# Patient Record
Sex: FEMALE | Race: WHITE | NOT HISPANIC OR LATINO | Employment: FULL TIME | URBAN - METROPOLITAN AREA
[De-identification: names, ages, dates, MRNs, and addresses within clinical notes are randomized per-mention and may not be internally consistent; named-entity substitution may affect disease eponyms.]

---

## 2017-01-17 ENCOUNTER — TELEPHONE (OUTPATIENT)
Dept: OPHTHALMOLOGY | Facility: CLINIC | Age: 22
End: 2017-01-17

## 2017-01-17 ENCOUNTER — OFFICE VISIT (OUTPATIENT)
Dept: OPHTHALMOLOGY | Facility: CLINIC | Age: 22
End: 2017-01-17
Attending: OPHTHALMOLOGY
Payer: COMMERCIAL

## 2017-01-17 DIAGNOSIS — H34.8192 CRVO (CENTRAL RETINAL VEIN OCCLUSION) (H): ICD-10-CM

## 2017-01-17 DIAGNOSIS — H53.10 SUBJECTIVE VISUAL DISTURBANCE: Primary | ICD-10-CM

## 2017-01-17 DIAGNOSIS — H34.8192 CRVO (CENTRAL RETINAL VEIN OCCLUSION) (H): Primary | ICD-10-CM

## 2017-01-17 PROCEDURE — 99212 OFFICE O/P EST SF 10 MIN: CPT | Mod: ZF

## 2017-01-17 ASSESSMENT — EXTERNAL EXAM - RIGHT EYE: OD_EXAM: NORMAL

## 2017-01-17 ASSESSMENT — SLIT LAMP EXAM - LIDS
COMMENTS: NORMAL
COMMENTS: NORMAL

## 2017-01-17 ASSESSMENT — REFRACTION_WEARINGRX
OS_CYLINDER: +0.50
OS_AXIS: 084
OD_AXIS: 146
OD_SPHERE: -4.75
OS_SPHERE: -4.75
SPECS_TYPE: SVL
OD_CYLINDER: +0.50

## 2017-01-17 ASSESSMENT — VISUAL ACUITY
METHOD: SNELLEN - LINEAR
OS_CC+: -1
CORRECTION_TYPE: GLASSES
OS_CC: 20/20
OD_CC: 20/20

## 2017-01-17 ASSESSMENT — TONOMETRY
OD_IOP_MMHG: 18
OS_IOP_MMHG: 17
IOP_METHOD: TONOPEN

## 2017-01-17 ASSESSMENT — CUP TO DISC RATIO
OD_RATIO: 0.05
OS_RATIO: 0.0

## 2017-01-17 ASSESSMENT — EXTERNAL EXAM - LEFT EYE: OS_EXAM: NORMAL

## 2017-01-17 ASSESSMENT — CONF VISUAL FIELD
OS_NORMAL: 1
OD_NORMAL: 1

## 2017-01-17 NOTE — PROGRESS NOTES
CC -  floaters both eyes  HPI - No change since FANTA.      Past History  Jennifer Stevens is a  21 year old year-old patient with history CRVO 11/2016. Lab workup normal, stopped OCPs. Had avastin injection for CME 11/3 with resolution. Presents with one week history of increasing floaters in left eye, and 1 day history of floaters. Denies flashing light, denies pain. She also notes increasing glare. She states that the floaters are black lines with a gray shadow and intermittent. At any given time, there are 2 floaters and they are visible >15 times daily      CBC- normal, ESR- normal, lupus anticoagulant - negative, PT- normal , PTT- normal, INR- normal, Activated Protein C Resistance - normal, Factor V Leiden- negative, anti-cardiolipin antibody-negative, vasculitis panel (myeloperoxidase antibody & proteinase 3 antibody) - negative, ANCA- negative, RPR-negative, quantiferon gold- negative, WILDA- negative, BMP-within normal limits,                                      PAST OCULAR HISTORY  None    FOHx/FMHx: mother with recurrent clots, unclear etiology (mentioned possible Takaysu Arteritis)    RETINAL IMAGING:  OCT  11-29-16  right eye- normal contour, no IRF/SRF, stable  left eye - no fluid, greatly imrproved    US  OD- likely ONH drusen  OS- ?mild ONH drusen    FA 11-1-16  RE: normal    LE: (transits) slowed venous filling, extensive blocking from IRH, late leakage from ONH and in macula, diffuse staining of veins, no obvious ischemia    OVF 30-2 12-27-16  OD - normal likely  OS - diffuse depression, better than prior    OCT 1/17/17  OD normal contour, no srf/irf  OS normal contour, no srf/irf    ASSESSMENT & PLAN  1. Subjective visual disturbance, both eyes     -ocular migraine vs PVD   -exam and oct grossly normal   -VA 20/20 OU   -schedule retina follow up one week   RTC precautions discussed       2.  Central Retinal Vein Occlusion OS              - young patient, unclear etiology              - onset  11/3/16              - likely non-ischemic given excellent vision today              - saw hematology stopped OCP              -Possible ON drusen Right eye > Left eye (not seen by Bscan)              - consider sleep study    3.  H/o CME OS              - resolved after Avastin #1 on 11/3/16              - observe today      4.  H/o Pain OS with eye movement              - eval by Dr. Presley for optic neuritis with MRI negative    5. ? Optic disc drusen   - to consider ON autofluorescence, GINNA OCT once disc edema resolves       Pt seen and discussed with Dr. Everette Daugherty MD  PGY2, Dept of Ophthalmology  [phone]    Attending Physician Attestation:  I have seen and examined this patient.  I have confirmed and edited as necessary the chief complaint(s), history of present illness, review of systems, relevant history, and examination findings as documented by others.  I have personally reviewed the relevant tests, images, and reports as documented above.  I have confirmed and edited as necessary the assessment and plan and agree with this note.  - Everette Powell MD 3:27 PM 1/18/2017

## 2017-01-17 NOTE — TELEPHONE ENCOUNTER
H/o central retina vein occlusion in past  Pt calling in reporting bilateral large floaters-- giant lines in vision  Visual acuity and peripheral vision stable  No flashing  Pt scheduled today for bubba Jackson RN 1:25 PM 01/17/2017

## 2017-01-17 NOTE — NURSING NOTE
Chief Complaints and History of Present Illnesses   Patient presents with     Follow Up For     large floater in both eyes     HPI    Affected eye(s):  Both   Symptoms:     Decreased vision (Comment: left eye seems a little worse)   Floaters (Comment: both eyes)   No flashes      Duration:  1 day      Do you have eye pain now?:  No      Comments:  Pt here for floaters, both eyes  Pt has had floaters in the left eye that had gotten worse over the last week or so  Floater in right eye started yesterday    Sabina Pérez COA 2:59 PM January 17, 2017

## 2017-01-25 ENCOUNTER — OFFICE VISIT (OUTPATIENT)
Dept: OPHTHALMOLOGY | Facility: CLINIC | Age: 22
End: 2017-01-25
Attending: OPHTHALMOLOGY
Payer: COMMERCIAL

## 2017-01-25 DIAGNOSIS — H53.10 SUBJECTIVE VISUAL DISTURBANCE: ICD-10-CM

## 2017-01-25 DIAGNOSIS — H34.8192 CRVO (CENTRAL RETINAL VEIN OCCLUSION) (H): Primary | ICD-10-CM

## 2017-01-25 PROCEDURE — 99213 OFFICE O/P EST LOW 20 MIN: CPT | Mod: ZF

## 2017-01-25 ASSESSMENT — TONOMETRY
IOP_METHOD: TONOPEN
OS_IOP_MMHG: 18
OD_IOP_MMHG: 14

## 2017-01-25 ASSESSMENT — EXTERNAL EXAM - LEFT EYE: OS_EXAM: NORMAL

## 2017-01-25 ASSESSMENT — CUP TO DISC RATIO
OS_RATIO: 0.0
OD_RATIO: 0.05

## 2017-01-25 ASSESSMENT — EXTERNAL EXAM - RIGHT EYE: OD_EXAM: NORMAL

## 2017-01-25 ASSESSMENT — VISUAL ACUITY
OD_CC: 20/20
OS_CC+: -1
OS_CC: 20/20
CORRECTION_TYPE: GLASSES
METHOD: SNELLEN - LINEAR

## 2017-01-25 ASSESSMENT — CONF VISUAL FIELD: OD_NORMAL: 1

## 2017-01-25 ASSESSMENT — SLIT LAMP EXAM - LIDS
COMMENTS: NORMAL
COMMENTS: NORMAL

## 2017-01-25 NOTE — MR AVS SNAPSHOT
After Visit Summary   2017    Jennifer Stevens    MRN: [id number]           Patient Information     Date Of Birth          1995        Visit Information        Provider Department      2017 7:45 AM Cyndi Sims MD [facility] Clinic        Today's Diagnoses     CRVO (central retinal vein occlusion)    -  1     Subjective visual disturbance - Right Eye            Follow-ups after your visit        Follow-up notes from your care team     Return in about 1 month (around 2017) for Re-check CRVO OS with DFE/OCT.      Your next 10 appointments already scheduled     2017  8:15 AM   RETURN RETINA with Cyndi Sims MD   Eye Clinic ([facility])    [address]  [address] [facility]  9th Fl Clin [address] [place] [id number] [phone]              Who to contact     Please call your clinic at [phone] to:    Ask questions about your health    Make or cancel appointments    Discuss your medicines    Learn about your test results    Speak to your doctor   If you have compliments or concerns about an experience at your clinic, or if you wish to file a complaint, please contact [facility] Patient Relations at [phone] or email us at [email or web address]         Additional Information About Your Visit        MyChart Information     Busportal is an electronic gateway that provides easy, online access to your medical records. With Busportal, you can request a clinic appointment, read your test results, renew a prescription or communicate with your care team.     To sign up for TheBlogTVt visit the website at [email or web address]   You will be asked to enter the access code listed below, as well as some personal information. Please follow the directions to create your username and password.     Your access code is: [email or web address]  Expires: 2017  9:48 PM     Your access code will  in 90 days.  If you need help or a new code, please contact your [facility] or call [phone] for assistance.        Care EveryWhere ID     This is your Care EveryWhere ID. This could be used by other organizations to access your [place] medical records  [id number]         Blood Pressure from Last 3 Encounters:   12/09/16 121/82   10/31/16 124/73    Weight from Last 3 Encounters:   12/09/16 90.402 kg (199 lb 4.8 oz)   10/31/16 88.4 kg (194 lb 14.2 oz)              Today, you had the following     No orders found for display       Primary Care Provider Office Phone # Fax #    Aminah RODRIGUEZ Jayla [phone]       [facility] [address] [facility] [address]        Thank you!     Thank you for choosing EYE CLINIC  for your care. Our goal is always to provide you with excellent care. Hearing back from our patients is one way we can continue to improve our services. Please take a few minutes to complete the written survey that you may receive in the mail after your visit with us. Thank you!             Your Updated Medication List - Protect others around you: Learn how to safely use, store and throw away your medicines at www.disposemymeds.org.      Notice  As of 1/25/2017  9:18 AM    You have not been prescribed any medications.

## 2017-01-25 NOTE — NURSING NOTE
Chief Complaints and History of Present Illnesses   Patient presents with     Follow Up For     Central Retinal Vein Occlusion OS     HPI    Affected eye(s):  Left   Symptoms:     No blurred vision   No decreased vision         Do you have eye pain now?:  No      Comments:  Pt states vision and eyes are stable since last visit.    Nataliya HERNANDEZ 8:21 AM January 25, 2017

## 2017-01-25 NOTE — PROGRESS NOTES
CC -  floaters both eyes  HPI - No change since FANTA.      Past History  Jennifer Stevens is a  21 year old year-old patient with history CRVO 11/2016. Lab workup normal, stopped OCPs. Had avastin injection for CME 11/3 with resolution. Presents with one week history of increasing floaters in left eye, and 1 day history of floaters. Denies flashing light, denies pain. She also notes increasing glare. She states that the floaters are black lines with a gray shadow and intermittent. At any given time, there are 2 floaters and they are visible >15 times daily    CBC- normal, ESR- normal, lupus anticoagulant - negative, PT- normal , PTT- normal, INR- normal, Activated Protein C Resistance - normal, Factor V Leiden- negative, anti-cardiolipin antibody-negative, vasculitis panel (myeloperoxidase antibody & proteinase 3 antibody) - negative, ANCA- negative, RPR-negative, quantiferon gold- negative, WILDA- negative, BMP-within normal limits,                     PAST OCULAR HISTORY  None    FOHx/FMHx: mother with recurrent clots, unclear etiology (mentioned possible Takaysu Arteritis)    RETINAL IMAGING:  OCT  11-29-16  right eye- normal contour, no IRF/SRF, stable  left eye - no fluid, greatly imrproved    US  OD- likely ONH drusen  OS- ?mild ONH drusen    FA 11-1-16  RE: normal    LE: (transits) slowed venous filling, extensive blocking from IRH, late leakage from ONH and in macula, diffuse staining of veins, no obvious ischemia    OVF 30-2 12-27-16  OD - normal likely  OS - diffuse depression, better than prior    OCT 1/17/17  OD normal contour, no srf/irf  OS normal contour, no srf/irf    ASSESSMENT & PLAN  1. Subjective visual disturbance, both eyes   - ocular migraine favored (no evidence of pvd/breaks on exam)   - VA 20/20 OU   - RT/RD precautions       2.  Central Retinal Vein Occlusion OS              - young patient, unclear etiology              - onset 11/3/16              - likely non-ischemic given excellent  vision today              - saw hematology stopped OCP              - Possible ON drusen Right eye > Left eye (not seen by Bscan)              - consider sleep study   - rare vitreous cell noted today, has had extensive negative w/u - will monitor    3.  H/o CME OS              - resolved after Avastin #1 on 11/3/16              - observe today    4.  H/o Pain OS with eye movement              - eval by Dr. Presley for optic neuritis with MRI negative    5. ? Optic disc drusen   - to consider ON autofluorescence, GINNA OCT once disc edema resolves    RTC: 1 month with Dr. Nelsy Barrios MD  Retina Fellow      Attestation:  I have seen and examined the patient with Dr. Barrios and agree with the findings in this note.     Cyndi Finley MD PhD.  Professor & Chair

## 2017-02-23 ENCOUNTER — OFFICE VISIT (OUTPATIENT)
Dept: OPHTHALMOLOGY | Facility: CLINIC | Age: 22
End: 2017-02-23
Attending: OPHTHALMOLOGY
Payer: COMMERCIAL

## 2017-02-23 DIAGNOSIS — H47.323 DRUSEN OF OPTIC DISC, BILATERAL: ICD-10-CM

## 2017-02-23 DIAGNOSIS — H34.8192 CRVO (CENTRAL RETINAL VEIN OCCLUSION) (H): Primary | ICD-10-CM

## 2017-02-23 DIAGNOSIS — H53.10 SUBJECTIVE VISUAL DISTURBANCE: ICD-10-CM

## 2017-02-23 PROCEDURE — 92250 FUNDUS PHOTOGRAPHY W/I&R: CPT | Mod: ZF | Performed by: OPHTHALMOLOGY

## 2017-02-23 PROCEDURE — 92134 CPTRZ OPH DX IMG PST SGM RTA: CPT | Mod: ZF | Performed by: OPHTHALMOLOGY

## 2017-02-23 PROCEDURE — 99212 OFFICE O/P EST SF 10 MIN: CPT | Mod: 25,ZF

## 2017-02-23 ASSESSMENT — EXTERNAL EXAM - LEFT EYE: OS_EXAM: NORMAL

## 2017-02-23 ASSESSMENT — TONOMETRY
OD_IOP_MMHG: 18
IOP_METHOD: ICARE
OS_IOP_MMHG: 18

## 2017-02-23 ASSESSMENT — REFRACTION_WEARINGRX
OS_AXIS: 084
OS_SPHERE: -4.75
SPECS_TYPE: SVL
OD_SPHERE: -4.75
OS_CYLINDER: +0.50
OD_AXIS: 146
OD_CYLINDER: +0.50

## 2017-02-23 ASSESSMENT — EXTERNAL EXAM - RIGHT EYE: OD_EXAM: NORMAL

## 2017-02-23 ASSESSMENT — VISUAL ACUITY
OS_CC: 20/20
METHOD: SNELLEN - LINEAR
OD_CC: 20/20

## 2017-02-23 ASSESSMENT — CUP TO DISC RATIO
OD_RATIO: 0.05
OS_RATIO: 0.0

## 2017-02-23 ASSESSMENT — SLIT LAMP EXAM - LIDS
COMMENTS: NORMAL
COMMENTS: NORMAL

## 2017-02-23 ASSESSMENT — CONF VISUAL FIELD
OS_NORMAL: 1
OD_NORMAL: 1

## 2017-02-23 NOTE — MR AVS SNAPSHOT
After Visit Summary   2/23/2017    Jennifer Stevens    MRN: [id number]           Patient Information     Date Of Birth          1995        Visit Information        Provider Department      2/23/2017 8:15 AM Cyndi Sims MD Eye Clinic        Today's Diagnoses     CRVO (central retinal vein occlusion)    -  1    Subjective visual disturbance - Right Eye        Drusen of optic disc, bilateral           Follow-ups after your visit        Follow-up notes from your care team     Return in about 6 months (around 8/23/2017) for  , CRVO, onh drusen, Exam and OVF OU.      Your next 10 appointments already scheduled     Aug 24, 2017  7:30 AM CDT   RETURN RETINA with Cyndi Sims MD   Eye Clinic ([facility])    [address]  [address] [facility]  9th Fl Clin 9a  [facility] [id number] [phone]              Future tests that were ordered for you today     Open Future Orders        Priority Expected Expires Ordered    Glaucoma Top OU Routine  8/27/2018 2/23/2017            Who to contact     Please call your clinic at [phone] to:    Ask questions about your health    Make or cancel appointments    Discuss your medicines    Learn about your test results    Speak to your doctor   If you have compliments or concerns about an experience at your clinic, or if you wish to file a complaint, please contact [facility] Patient Relations at [phone] or email us at [email or web address]         Additional Information About Your Visit        MyChart Information     Verengo Solart is an electronic gateway that provides easy, online access to your medical records. With Predictify, you can request a clinic appointment, read your test results, renew a prescription or communicate with your care team.     To sign up for Verengo Solart visit the website at [email or web address]   You will be asked to enter the access code listed below,  as well as some personal information. Please follow the directions to create your username and password.     Your access code is: [email or web address]  Expires: 5/10/2017  8:30 AM     Your access code will  in 90 days. If you need help or a new code, please contact your [facility] or call [phone] for assistance.        Care EveryWhere ID     This is your Care EveryWhere ID. This could be used by other organizations to access your [place] medical records  [id number]         Blood Pressure from Last 3 Encounters:   16 121/82   10/31/16 124/73    Weight from Last 3 Encounters:   16 90.4 kg (199 lb 4.8 oz)   10/31/16 88.4 kg (194 lb 14.2 oz)              We Performed the Following     Fundus Autofluorescence Image (FAF) OU (both eyes)     OCT Retina Spectralis OU (both eyes)        Primary Care Provider Office Phone # Fax #    Aminah JENNIFER Dickerson [phone]       [facility] [address] [facility] [address]        Thank you!     Thank you for choosing EYE CLINIC  for your care. Our goal is always to provide you with excellent care. Hearing back from our patients is one way we can continue to improve our services. Please take a few minutes to complete the written survey that you may receive in the mail after your visit with us. Thank you!             Your Updated Medication List - Protect others around you: Learn how to safely use, store and throw away your medicines at www.disposemymeds.org.      Notice  As of 2017  9:50 AM    You have not been prescribed any medications.

## 2017-02-23 NOTE — PROGRESS NOTES
CC -  floaters both eyes  HPI - No change since FANTA.      Past History  Jennifer Stevens is a  21 year old year-old patient with history CRVO 11/2016. Lab workup normal, stopped OCPs. Had avastin injection for CME 11/3 with resolution. Presents with one week history of increasing floaters in left eye, and 1 day history of floaters. Denies flashing light, denies pain. She also notes increasing glare. She states that the floaters are black lines with a gray shadow and intermittent. At any given time, there are 2 floaters and they are visible >15 times daily    CBC- normal, ESR- normal, lupus anticoagulant - negative, PT- normal , PTT- normal, INR- normal, Activated Protein C Resistance - normal, Factor V Leiden- negative, anti-cardiolipin antibody-negative, vasculitis panel (myeloperoxidase antibody & proteinase 3 antibody) - negative, ANCA- negative, RPR-negative, quantiferon gold- negative, WILDA- negative, BMP-within normal limits,                     PAST OCULAR HISTORY  None    FOHx/FMHx: mother with recurrent clots, unclear etiology (mentioned possible Takaysu Arteritis)    RETINAL IMAGING:  OCT  11-29-16  right eye- normal contour, no IRF/SRF, stable  left eye - no fluid, greatly imrproved    US  OD- likely ONH drusen  OS- ?mild ONH drusen    FA 11-1-16  RE: normal    LE: (transits) slowed venous filling, extensive blocking from IRH, late leakage from ONH and in macula, diffuse staining of veins, no obvious ischemia    OVF 30-2 12-27-16  OD - normal likely  OS - diffuse depression, better than prior    OCT 1/17/17 and today OD normal contour, no srf/irf  OS normal contour, no srf/irf    ASSESSMENT & PLAN  1. Subjective visual disturbance, both eyes   - ocular migraine favored (no evidence of pvd/breaks on exam)   - VA 20/20 OU   - RT/RD precautions       2.  Central Retinal Vein Occlusion OS              - young patient, unclear etiology              - onset 11/3/16              - likely non-ischemic given  excellent vision today              - saw hematology stopped OCP              - Possible ON drusen Right eye > Left eye (not seen by Bscan)              - consider sleep study   - rare vitreous cell noted today, has had extensive negative w/u - will monitor    3.  H/o CME OS              - resolved after Avastin #1 on 11/3/16              - observe today    4.  H/o Pain OS with eye movement              - eval by Dr. Presley for optic neuritis with MRI negative    5. Optic disc drusen both eyes    - to consider ON autofluorescence, GINNA OCT once disc edema resolves    RTC: 6 months with Dr. Whittington and repeat OVF both eyes        Cyndi Finley MD PhD.  Professor & Chair

## 2017-02-23 NOTE — NURSING NOTE
Chief Complaints and History of Present Illnesses   Patient presents with     Follow Up For      Central Retinal Vein Occlusion OS     HPI    Affected eye(s):  Both   Symptoms:        Duration:  1 month   Frequency:  Constant       Do you have eye pain now?:  No      Comments:  Pt. States that she is doing great after having birth control nexplanon removed.   No longer having any symptoms BE.  Geneva Wilson COT 8:13 AM February 23, 2017

## 2017-05-11 ENCOUNTER — OFFICE VISIT (OUTPATIENT)
Dept: OPHTHALMOLOGY | Facility: CLINIC | Age: 22
End: 2017-05-11
Attending: OPHTHALMOLOGY
Payer: COMMERCIAL

## 2017-05-11 DIAGNOSIS — H04.123 DRY EYE SYNDROME, BILATERAL: ICD-10-CM

## 2017-05-11 DIAGNOSIS — H34.8192 CRVO (CENTRAL RETINAL VEIN OCCLUSION) (H): Primary | ICD-10-CM

## 2017-05-11 DIAGNOSIS — H34.8192 CENTRAL VEIN OCCLUSION OF RETINA (H): Primary | ICD-10-CM

## 2017-05-11 PROCEDURE — 99212 OFFICE O/P EST SF 10 MIN: CPT | Mod: ZF

## 2017-05-11 PROCEDURE — 92134 CPTRZ OPH DX IMG PST SGM RTA: CPT | Mod: ZF | Performed by: OPHTHALMOLOGY

## 2017-05-11 RX ORDER — COPPER 313.4 MG/1
1 INTRAUTERINE DEVICE INTRAUTERINE ONCE
COMMUNITY
End: 2021-01-11

## 2017-05-11 ASSESSMENT — REFRACTION_WEARINGRX
OD_AXIS: 146
OD_CYLINDER: +0.50
OS_AXIS: 084
OS_CYLINDER: +0.50
OS_SPHERE: -4.75
SPECS_TYPE: SVL
OD_SPHERE: -4.75

## 2017-05-11 ASSESSMENT — VISUAL ACUITY
OS_CC+: -1
OS_CC: 20/20
CORRECTION_TYPE: GLASSES
METHOD: SNELLEN - LINEAR
OD_CC: 20/20

## 2017-05-11 ASSESSMENT — SLIT LAMP EXAM - LIDS
COMMENTS: NORMAL
COMMENTS: NORMAL

## 2017-05-11 ASSESSMENT — CUP TO DISC RATIO
OD_RATIO: 0.05
OS_RATIO: 0.0

## 2017-05-11 ASSESSMENT — CONF VISUAL FIELD
OS_NORMAL: 1
OD_NORMAL: 1

## 2017-05-11 ASSESSMENT — EXTERNAL EXAM - LEFT EYE: OS_EXAM: NORMAL

## 2017-05-11 ASSESSMENT — TONOMETRY
OD_IOP_MMHG: 19
IOP_METHOD: TONOPEN
OS_IOP_MMHG: 18

## 2017-05-11 ASSESSMENT — EXTERNAL EXAM - RIGHT EYE: OD_EXAM: NORMAL

## 2017-05-11 NOTE — MR AVS SNAPSHOT
After Visit Summary   2017    Jennifer Stevens    MRN: [id number]           Patient Information     Date Of Birth          1995        Visit Information        Provider Department      2017 7:45 AM Enmanuel Barrios MD Eye Clinic        Today's Diagnoses     CRVO (central retinal vein occlusion)    -  1    Dry eye syndrome, bilateral           Follow-ups after your visit        Follow-up notes from your care team     Return in about 3 months (around 2017) for CRVO for OVF 24-2, DFE, OCT.      Your next 10 appointments already scheduled     Aug 24, 2017  7:30 AM CDT   RETURN RETINA with Cyndi Sims MD   Eye Clinic ([facility])    [address]  [address] [facility]  9th Fl Clin 9a  [facility] [id number] [phone]              Who to contact     Please call your clinic at [phone] to:    Ask questions about your health    Make or cancel appointments    Discuss your medicines    Learn about your test results    Speak to your doctor   If you have compliments or concerns about an experience at your clinic, or if you wish to file a complaint, please contact [facility] Patient Relations at [phone] or email us at [email or web address]         Additional Information About Your Visit        MyChart Information     Skinny Mom is an electronic gateway that provides easy, online access to your medical records. With Skinny Mom, you can request a clinic appointment, read your test results, renew a prescription or communicate with your care team.     To sign up for Zattikkat visit the website at [email or web address]   You will be asked to enter the access code listed below, as well as some personal information. Please follow the directions to create your username and password.     Your access code is: [email or web address]  Expires: 2017  6:30 AM     Your access code will  in 90 days. If you need help or a  new code, please contact your [facility] or call [phone] for assistance.        Care EveryWhere ID     This is your Care EveryWhere ID. This could be used by other organizations to access your [place] medical records  [id number]         Blood Pressure from Last 3 Encounters:   12/09/16 121/82   10/31/16 124/73    Weight from Last 3 Encounters:   12/09/16 90.4 kg (199 lb 4.8 oz)   10/31/16 88.4 kg (194 lb 14.2 oz)              Today, you had the following     No orders found for display       Primary Care Provider Office Phone # Fax #    Aminah RODRIGUEZ Jayla [phone]       [facility] [address] [facility] [address]        Thank you!     Thank you for choosing EYE CLINIC  for your care. Our goal is always to provide you with excellent care. Hearing back from our patients is one way we can continue to improve our services. Please take a few minutes to complete the written survey that you may receive in the mail after your visit with us. Thank you!             Your Updated Medication List - Protect others around you: Learn how to safely use, store and throw away your medicines at www.disposemymeds.org.          This list is accurate as of: 5/11/17  8:33 AM.  Always use your most recent med list.                   Brand Name Dispense Instructions for use    paragard intrauterine copper      1 each by Intrauterine route once

## 2017-05-11 NOTE — PROGRESS NOTES
CC -  floaters both eyes    Interval History - vision improving, bubble getting smaller, no pain    HPI - No change since FANTA.      Past History  Jennifer Stevens is a  21 year old year-old patient with history CRVO 11/2016. Lab workup normal, stopped OCPs. Had avastin injection for CME 11/3 with resolution. Presents with one week history of increasing floaters in left eye, and 1 day history of floaters. Denies flashing light, denies pain. She also notes increasing glare. She states that the floaters are black lines with a gray shadow and intermittent. At any given time, there are 2 floaters and they are visible >15 times daily    CBC- normal, ESR- normal, lupus anticoagulant - negative, PT- normal , PTT- normal, INR- normal, Activated Protein C Resistance - normal, Factor V Leiden- negative, anti-cardiolipin antibody-negative, vasculitis panel (myeloperoxidase antibody & proteinase 3 antibody) - negative, ANCA- negative, RPR-negative, quantiferon gold- negative, WILDA- negative, BMP-within normal limits,                     PAST OCULAR HISTORY  None    FOHx/FMHx: mother with recurrent clots, unclear etiology (mentioned possible Takaysu Arteritis)    RETINAL IMAGING:  OCT  5/11/17  right eye- normal contour, stable  left eye - normal contour, no fluid, stable    US (prior)  OD- likely ONH drusen  OS- ?mild ONH drusen    FA 11-1-16  RE: normal    LE: (transits) slowed venous filling, extensive blocking from IRH, late leakage from ONH and in macula, diffuse staining of veins, no obvious ischemia    OVF 30-2 12-27-16  OD - normal likely  OS - diffuse depression, better than prior    ASSESSMENT & PLAN  1. Dry Eye Syndrome, both eyes   - left > right, likely source of monocular diplopia and vision changes   - encouraged artificial tears 3-4x daily       2.  Central Retinal Vein Occlusion OS              - young patient, unclear etiology              - onset 11/3/16              - likely non-ischemic given excellent  vision              - saw hematology stopped OCP              - Possible ON drusen Right eye > Left eye (not seen by Bscan)   - no fluid today              3.  H/o CME OS              - resolved after Avastin #1 on 11/3/16              - observe today    4.  H/o Pain OS with eye movement              - eval by Dr. Presley for optic neuritis with MRI negative    5. Optic disc drusen both eyes    - to consider ON autofluorescence, GINNA OCT once disc edema resolves    RTC: 3 months with OVF, OCT; sooner for changes    ATTESTATION    I have confirmed the CC, PMH, HPI, history, ROS, and exam findings by the technician or others, and modified by me where needed. I have confirmed and edited as necessary the relevant ophthalmic history, ROS, eye exam findings, and the neuro exam findings as obtained by others. I have seen and examined this patient. I was present for critical portions of the procedure carried out by the resident/fellow and immediately available for the entire procedure and I personally viewed the image(s) and I agree with the interpretation as documented by the resident/fellow/or others and edited by me.  Enmanuel Barrios MD  Retina Fellow

## 2017-05-11 NOTE — NURSING NOTE
"Chief Complaints and History of Present Illnesses   Patient presents with     Follow Up For     2.5 month f/u CRVO, left eye - new waves     HPI    Affected eye(s):  Left   Symptoms:     No decreased vision   Double vision (Comment: left eye only occasionally)   Floaters (Comment: floaters all the time; stable)   No flashes      Duration:  3 months      Do you have eye pain now?:  No      Comments:  2.5 month f/u CRVO, left eye  Pt states she is noting episodes of \"wavy/staticky\" vision in the left eye  Also having episodes of double vision in the left eye only - doesn't coincide with the \"staticky\" vision    Sabina HERNANDEZ 7:37 AM May 11, 2017              "

## 2017-08-24 ENCOUNTER — OFFICE VISIT (OUTPATIENT)
Dept: OPHTHALMOLOGY | Facility: CLINIC | Age: 22
End: 2017-08-24
Attending: OPHTHALMOLOGY
Payer: COMMERCIAL

## 2017-08-24 DIAGNOSIS — H34.8192 CRVO (CENTRAL RETINAL VEIN OCCLUSION) (H): Primary | ICD-10-CM

## 2017-08-24 PROCEDURE — 92134 CPTRZ OPH DX IMG PST SGM RTA: CPT | Mod: ZF | Performed by: OPHTHALMOLOGY

## 2017-08-24 PROCEDURE — 99213 OFFICE O/P EST LOW 20 MIN: CPT | Mod: ZF

## 2017-08-24 ASSESSMENT — TONOMETRY
OD_IOP_MMHG: 18
IOP_METHOD: TONOPEN
OS_IOP_MMHG: 18

## 2017-08-24 ASSESSMENT — REFRACTION_WEARINGRX
OD_SPHERE: -4.75
SPECS_TYPE: SVL
OD_AXIS: 146
OS_CYLINDER: +0.50
OS_SPHERE: -4.75
OS_AXIS: 084
OD_CYLINDER: +0.50

## 2017-08-24 ASSESSMENT — CONF VISUAL FIELD
OD_NORMAL: 1
OS_NORMAL: 1
METHOD: COUNTING FINGERS

## 2017-08-24 ASSESSMENT — SLIT LAMP EXAM - LIDS
COMMENTS: NORMAL
COMMENTS: NORMAL

## 2017-08-24 ASSESSMENT — CUP TO DISC RATIO
OD_RATIO: 0.05
OS_RATIO: 0.0

## 2017-08-24 ASSESSMENT — VISUAL ACUITY
CORRECTION_TYPE: GLASSES
OD_CC: 20/20
METHOD: SNELLEN - LINEAR
OS_CC: 20/20

## 2017-08-24 ASSESSMENT — EXTERNAL EXAM - LEFT EYE: OS_EXAM: NORMAL

## 2017-08-24 ASSESSMENT — EXTERNAL EXAM - RIGHT EYE: OD_EXAM: NORMAL

## 2017-08-24 NOTE — MR AVS SNAPSHOT
After Visit Summary   8/24/2017    Jennifer Stevens    MRN: [id number]           Patient Information     Date Of Birth          1995        Visit Information        Provider Department      8/24/2017 7:30 AM Cyndi Sims MD [facility]        Today's Diagnoses     CRVO (central retinal vein occlusion)    -  1       Follow-ups after your visit        Follow-up notes from your care team     Return in about 6 months (around 2/24/2018) for CRVO, OCT Macula.      Your next 10 appointments already scheduled     Feb 21, 2018 10:15 AM CST   RETURN RETINA with Cyndi Sims MD   Eye Clinic ([facility])    [address]  [address] [facility]  9th [address] Clin [address]  [facility] [id number] [phone]              Future tests that were ordered for you today     Open Future Orders        Priority Expected Expires Ordered    OCT Retina Spectralis OU (both eyes) Routine  2/25/2019 8/24/2017            Who to contact     Please call your clinic at [phone] to:    Ask questions about your health    Make or cancel appointments    Discuss your medicines    Learn about your test results    Speak to your doctor   If you have compliments or concerns about an experience at your clinic, or if you wish to file a complaint, please contact [facility] Patient Relations at [phone] or email us at [email or web address]         Additional Information About Your Visit        MyChart Information     [employer] is an electronic gateway that provides easy, online access to your medical records. With Grovo, you can request a clinic appointment, read your test results, renew a prescription or communicate with your care team.     To sign up for [employer] visit the website at [email or web address]   You will be asked to enter the access code listed below, as well as some personal information. Please follow the directions to create your  username and password.     Your access code is: [email or web address]  Expires: 2017  6:31 AM     Your access code will  in 90 days. If you need help or a new code, please contact your [facility] or call [phone] for assistance.        Care EveryWhere ID     This is your Care EveryWhere ID. This could be used by other organizations to access your [place] medical records  [id number]         Blood Pressure from Last 3 Encounters:   16 121/82   10/31/16 124/73    Weight from Last 3 Encounters:   16 90.4 kg (199 lb 4.8 oz)   10/31/16 88.4 kg (194 lb 14.2 oz)              We Performed the Following     OCT Retina Spectralis OU (both eyes)        Primary Care Provider Office Phone # Fax #    Aminah Dickerson [phone]       [facility] [address] [facility] [address]        Equal Access to Services     ALEXANDREA VILLAGOMEZ : Hadii aad ku hadasho Soomaali, waaxda luqadaha, qaybta kaalmada adeegyada, waxay idiin hayaan adeeg kharash la'aan . So [facility] [phone].    ATENCIÓN: Si habla español, tiene a jones disposición servicios gratuitos de asistencia lingüística. [facility] [phone].    We comply with applicable federal civil rights laws and [place] laws. We do not discriminate on the basis of race, color, national origin, age, disability sex, sexual orientation or gender identity.            Thank you!     Thank you for choosing EYE CLINIC  for your care. Our goal is always to provide you with excellent care. Hearing back from our patients is one way we can continue to improve our services. Please take a few minutes to complete the written survey that you may receive in the mail after your visit with us. Thank you!             Your Updated Medication List - Protect others around you: Learn how to safely use, store and throw away your medicines at www.disposemymeds.org.          This list is accurate as of: 17  9:07 AM.  Always use  your most recent med list.                   Brand Name Dispense Instructions for use Diagnosis    paragard intrauterine copper      1 each by Intrauterine route once

## 2017-08-24 NOTE — PROGRESS NOTES
"I have confirmed the patient's and reviewed Past Medical History, Past Surgical History, Social History, Family History, Problem List, Medication List and agree with Tech note.      CC -  floaters both eyes  HPI - notices transient decreased vision right eye when covering left,\"like a curtain covering it\" in the far periphery then returns to baseline. Floaters stable. Denies flashes      Past History  Jennifer Stevens is a  21 year old year-old patient with history CRVO OS 11/2016. Lab workup normal, stopped OCPs. Had avastin injection for CME 11/3 with resolution. She states that the floaters are black lines with a gray shadow and intermittent. At any given time, there are 2 floaters and they are visible >15 times daily      CBC- normal, ESR- normal, lupus anticoagulant - negative, PT- normal , PTT- normal, INR- normal, Activated Protein C Resistance - normal, Factor V Leiden- negative, anti-cardiolipin antibody-negative, vasculitis panel (myeloperoxidase antibody & proteinase 3 antibody) - negative, ANCA- negative, RPR-negative, quantiferon gold- negative, WILDA- negative, BMP-within normal limits,                          PAST OCULAR HISTORY  None    FOHx/FMHx: mother with recurrent clots, unclear etiology (mentioned possible Takaysu Arteritis)    RETINAL IMAGING:  OCT  8/24/17  right eye- normal contour, no IRF/SRF, stable  left eye - normal contour, no IRF/SRF    HVF 8/24/17  RE reliable study, nonspecific superior temporal defect  LE reliable study, wnl    US  OD- likely ONH drusen  OS- ?mild ONH drusen    FA 11-1-16  RE: normal    LE: (transits) slowed venous filling, extensive blocking from IRH, late leakage from ONH and in macula, diffuse staining of veins, no obvious ischemia    OVF 30-2 12-27-16  OD - normal likely  OS - diffuse depression, better than prior        ASSESSMENT & PLAN  1. Subjective visual disturbance, both eyes   -ocular migraine vs PVD   -exam and oct grossly normal   -VA 20/20 " OU   -recheck VF 1 year        2.  Central Retinal Vein Occlusion OS              - young patient, unclear etiology              - onset 11/1/16   - s/p Avastin x1 11/2016              - likely non-ischemic given excellent vision today              - saw hematology stopped OCP              - no NVI    3.  H/o CME OS              - resolved after Avastin #1 on 11/3/16              - observe today      4.  H/o Pain OS with eye movement              - eval by Dr. Presley for optic neuritis with MRI negative    5. Optic disc drusen   - to consider ON autofluorescence, GINNA OCT    - seen on B scan    6. Dry Eye Syndrome, both eyes                       - encouraged artificial tears 3-4x daily         RTC 6 months    Kulwinder Daugherty MD  PGY2, Dept of Ophthalmology  [phone]    ATTESTATION:  I have seen and examined the patient with Dr. Daugherty and agree with the findings in this note, as well as the interpretations of the diagnostic tests.    Cyndi Finley MD PhD.  Professor & Chair

## 2017-08-24 NOTE — NURSING NOTE
Chief Complaints and History of Present Illnesses   Patient presents with     Follow Up For     CRVO      HPI    Affected eye(s):  Both   Symptoms:        Frequency:  Constant       Do you have eye pain now?:  No      Comments:  Noticing out of the RE that objects and letter will disappear then come right back  +floaters but not new  Merline Theodore COT 7:57 AM August 24, 2017

## 2018-02-21 ENCOUNTER — OFFICE VISIT (OUTPATIENT)
Dept: OPHTHALMOLOGY | Facility: CLINIC | Age: 23
End: 2018-02-21
Attending: OPHTHALMOLOGY
Payer: COMMERCIAL

## 2018-02-21 DIAGNOSIS — H34.8192 CRVO (CENTRAL RETINAL VEIN OCCLUSION) (H): ICD-10-CM

## 2018-02-21 PROCEDURE — G0463 HOSPITAL OUTPT CLINIC VISIT: HCPCS | Mod: ZF

## 2018-02-21 PROCEDURE — 92134 CPTRZ OPH DX IMG PST SGM RTA: CPT | Mod: ZF | Performed by: OPHTHALMOLOGY

## 2018-02-21 ASSESSMENT — TONOMETRY
OD_IOP_MMHG: 16
OS_IOP_MMHG: 17
IOP_METHOD: ICARE

## 2018-02-21 ASSESSMENT — CONF VISUAL FIELD
OS_NORMAL: 1
OD_NORMAL: 1

## 2018-02-21 ASSESSMENT — REFRACTION_WEARINGRX
OS_SPHERE: -4.75
SPECS_TYPE: SVL
OD_CYLINDER: +0.50
OS_AXIS: 084
OS_CYLINDER: +0.50
OD_AXIS: 146
OD_SPHERE: -4.75

## 2018-02-21 ASSESSMENT — VISUAL ACUITY
OS_CC+: -2
OD_CC+: -2
OD_CC: 20/20
OS_CC: 20/20
CORRECTION_TYPE: GLASSES
METHOD: SNELLEN - LINEAR

## 2018-02-21 NOTE — MR AVS SNAPSHOT
After Visit Summary   2018    Jennifer Stevens    MRN: [id number]           Patient Information     Date Of Birth          1995        Visit Information        Provider Department      2018 10:15 AM yCndi Sims MD [facility] Clinic        Today's Diagnoses     CRVO (central retinal vein occlusion)           Follow-ups after your visit        Follow-up notes from your care team     Return in about 6 months (around 2018) for CRVO, Exam & OCT OU.      Your next 10 appointments already scheduled     Aug 22, 2018 10:15 AM CDT   RETURN RETINA with Cyndi Sims MD   Eye Clinic ([facility])    [address] [place] [id number] [phone]              Future tests that were ordered for you today     Open Future Orders        Priority Expected Expires Ordered    OCT Retina Spectralis OU (both eyes) Routine  2019            Who to contact     Please call your clinic at [phone] to:    Ask questions about your health    Make or cancel appointments    Discuss your medicines    Learn about your test results    Speak to your doctor            Additional Information About Your Visit        MyChart Information     [employer] is an electronic gateway that provides easy, online access to your medical records. With Splash, you can request a clinic appointment, read your test results, renew a prescription or communicate with your care team.     To sign up for [employer] visit the website at [email or web address]   You will be asked to enter the access code listed below, as well as some personal information. Please follow the directions to create your username and password.     Your access code is: [email or web address]  Expires: 2018  6:30 AM     Your access code will  in 90 days. If you need help or a new code, please contact your [facility] or call  [phone] for assistance.        Care EveryWhere ID     This is your Care EveryWhere ID. This could be used by other organizations to access your [place] medical records  [id number]         Blood Pressure from Last 3 Encounters:   12/09/16 121/82   10/31/16 124/73    Weight from Last 3 Encounters:   12/09/16 90.4 kg (199 lb 4.8 oz)   10/31/16 88.4 kg (194 lb 14.2 oz)              We Performed the Following     OCT Retina Spectralis OU (both eyes)        Primary Care Provider Office Phone # Fax #    Aminah Dickerson [phone]       [facility] [address] [facility] [address]        Equal Access to Services     STEPHEN VILLAGOMEZ : Hadii aad ku hadasho Soomaali, waaxda luqadaha, qaybta kaalmada adeegyada, waxay idiin haygonzalon kateryna bowman . So [facility] [phone].    ATENCIÓN: Si habla español, tiene a jones disposición servicios gratuitos de asistencia lingüística. [facility] [phone].    We comply with applicable federal civil rights laws and [place] laws. We do not discriminate on the basis of race, color, national origin, age, disability, sex, sexual orientation, or gender identity.            Thank you!     Thank you for choosing EYE CLINIC  for your care. Our goal is always to provide you with excellent care. Hearing back from our patients is one way we can continue to improve our services. Please take a few minutes to complete the written survey that you may receive in the mail after your visit with us. Thank you!             Your Updated Medication List - Protect others around you: Learn how to safely use, store and throw away your medicines at www.disposemymeds.org.          This list is accurate as of 2/21/18 11:44 AM.  Always use your most recent med list.                   Brand Name Dispense Instructions for use Diagnosis    paragard intrauterine copper      1 each by Intrauterine route once

## 2018-02-21 NOTE — NURSING NOTE
Chief Complaints and History of Present Illnesses   Patient presents with     Follow Up For     CRVO (central retinal vein occlusion     HPI    Affected eye(s):  Right   Symptoms:        Duration:  6 months   Frequency:  Constant       Do you have eye pain now?:  No      Comments:  Pt. States that some mornings, RE feels like it can't open.  RE is open but it feels like it is closed.   No change in VA BE.  Still a lot floaters RE.  No flashes BE,  Geneva Wilson COT 10:53 AM February 21, 2018

## 2018-02-21 NOTE — LETTER
To Whom It May Concern,    Jennifer Stevens (Date of Birth  1995) was evaluated in our clinic on 2/21/18. She has normal central in both eyes and her peripheral vision is intact in both eyes. She was diagnosed with central retinal vein occlusion left eye in 2016 which is completely resolved with medication and her vision is back to baseline.Please do not hesitate to contact our office if you need any further information.     Best Regards,     Andrew Damon MD, PhD  Vitreoretinal Surgery Fellow  Department of Ophthalmology   [facility]  Tel: [phone]  Fax: [phone]

## 2018-03-16 ENCOUNTER — COMMUNICATION - HEALTHEAST (OUTPATIENT)
Dept: TELEHEALTH | Facility: CLINIC | Age: 23
End: 2018-03-16

## 2018-03-16 ENCOUNTER — OFFICE VISIT - HEALTHEAST (OUTPATIENT)
Dept: FAMILY MEDICINE | Facility: CLINIC | Age: 23
End: 2018-03-16

## 2018-03-16 DIAGNOSIS — F50.819 BINGE EATING DISORDER: ICD-10-CM

## 2018-03-16 DIAGNOSIS — F33.1 MODERATE EPISODE OF RECURRENT MAJOR DEPRESSIVE DISORDER (H): ICD-10-CM

## 2018-03-16 LAB — TSH SERPL DL<=0.005 MIU/L-ACNC: 1.44 UIU/ML (ref 0.3–5)

## 2018-03-19 ENCOUNTER — COMMUNICATION - HEALTHEAST (OUTPATIENT)
Dept: FAMILY MEDICINE | Facility: CLINIC | Age: 23
End: 2018-03-19

## 2018-04-05 ENCOUNTER — COMMUNICATION - HEALTHEAST (OUTPATIENT)
Dept: BEHAVIORAL HEALTH | Facility: CLINIC | Age: 23
End: 2018-04-05

## 2018-04-09 ENCOUNTER — OFFICE VISIT - HEALTHEAST (OUTPATIENT)
Dept: BEHAVIORAL HEALTH | Facility: CLINIC | Age: 23
End: 2018-04-09

## 2018-04-09 DIAGNOSIS — F50.819 BINGE EATING DISORDER: ICD-10-CM

## 2018-04-09 DIAGNOSIS — F33.1 MODERATE EPISODE OF RECURRENT MAJOR DEPRESSIVE DISORDER (H): ICD-10-CM

## 2018-04-26 ENCOUNTER — AMBULATORY - HEALTHEAST (OUTPATIENT)
Dept: BEHAVIORAL HEALTH | Facility: CLINIC | Age: 23
End: 2018-04-26

## 2018-04-26 ENCOUNTER — COMMUNICATION - HEALTHEAST (OUTPATIENT)
Dept: TELEHEALTH | Facility: CLINIC | Age: 23
End: 2018-04-26

## 2018-04-26 ENCOUNTER — OFFICE VISIT - HEALTHEAST (OUTPATIENT)
Dept: BEHAVIORAL HEALTH | Facility: CLINIC | Age: 23
End: 2018-04-26

## 2018-04-26 DIAGNOSIS — F50.819 BINGE EATING DISORDER: ICD-10-CM

## 2018-04-26 DIAGNOSIS — F33.1 MODERATE EPISODE OF RECURRENT MAJOR DEPRESSIVE DISORDER (H): ICD-10-CM

## 2018-05-10 ENCOUNTER — OFFICE VISIT - HEALTHEAST (OUTPATIENT)
Dept: BEHAVIORAL HEALTH | Facility: CLINIC | Age: 23
End: 2018-05-10

## 2018-05-10 DIAGNOSIS — F50.819 BINGE EATING DISORDER: ICD-10-CM

## 2018-05-10 DIAGNOSIS — F33.1 MODERATE EPISODE OF RECURRENT MAJOR DEPRESSIVE DISORDER (H): ICD-10-CM

## 2018-07-03 PROBLEM — N94.6 DYSMENORRHEA: Status: ACTIVE | Noted: 2018-07-03

## 2018-07-03 PROBLEM — N94.10 DYSPAREUNIA IN FEMALE: Status: ACTIVE | Noted: 2018-07-03

## 2018-07-05 ENCOUNTER — ANESTHESIA (OUTPATIENT)
Dept: SURGERY | Facility: CLINIC | Age: 23
End: 2018-07-05
Payer: COMMERCIAL

## 2018-07-05 ENCOUNTER — HOSPITAL ENCOUNTER (OUTPATIENT)
Facility: CLINIC | Age: 23
Discharge: HOME | End: 2018-07-05
Attending: OBSTETRICS & GYNECOLOGY | Admitting: OBSTETRICS & GYNECOLOGY
Payer: COMMERCIAL

## 2018-07-05 ENCOUNTER — SURGERY (OUTPATIENT)
Age: 23
End: 2018-07-05

## 2018-07-05 ENCOUNTER — ANESTHESIA EVENT (OUTPATIENT)
Dept: SURGERY | Facility: CLINIC | Age: 23
End: 2018-07-05
Payer: COMMERCIAL

## 2018-07-05 VITALS
RESPIRATION RATE: 18 BRPM | OXYGEN SATURATION: 100 % | TEMPERATURE: 97.5 F | BODY MASS INDEX: 34.83 KG/M2 | HEIGHT: 67 IN | SYSTOLIC BLOOD PRESSURE: 101 MMHG | DIASTOLIC BLOOD PRESSURE: 69 MMHG | WEIGHT: 221.9 LBS

## 2018-07-05 DIAGNOSIS — N94.6 DYSMENORRHEA: Primary | ICD-10-CM

## 2018-07-05 LAB — B-HCG SERPL-ACNC: <1 IU/L (ref 0–5)

## 2018-07-05 PROCEDURE — 40000170 ZZH STATISTIC PRE-PROCEDURE ASSESSMENT II: Performed by: OBSTETRICS & GYNECOLOGY

## 2018-07-05 PROCEDURE — 25000128 H RX IP 250 OP 636: Performed by: ANESTHESIOLOGY

## 2018-07-05 PROCEDURE — 37000009 ZZH ANESTHESIA TECHNICAL FEE, EACH ADDTL 15 MIN: Performed by: OBSTETRICS & GYNECOLOGY

## 2018-07-05 PROCEDURE — 25000132 ZZH RX MED GY IP 250 OP 250 PS 637: Performed by: OBSTETRICS & GYNECOLOGY

## 2018-07-05 PROCEDURE — 71000013 ZZH RECOVERY PHASE 1 LEVEL 1 EA ADDTL HR: Performed by: OBSTETRICS & GYNECOLOGY

## 2018-07-05 PROCEDURE — 25000128 H RX IP 250 OP 636: Performed by: NURSE ANESTHETIST, CERTIFIED REGISTERED

## 2018-07-05 PROCEDURE — 27210794 ZZH OR GENERAL SUPPLY STERILE: Performed by: OBSTETRICS & GYNECOLOGY

## 2018-07-05 PROCEDURE — 25000132 ZZH RX MED GY IP 250 OP 250 PS 637: Performed by: ANESTHESIOLOGY

## 2018-07-05 PROCEDURE — 71000012 ZZH RECOVERY PHASE 1 LEVEL 1 FIRST HR: Performed by: OBSTETRICS & GYNECOLOGY

## 2018-07-05 PROCEDURE — 25000566 ZZH SEVOFLURANE, EA 15 MIN: Performed by: OBSTETRICS & GYNECOLOGY

## 2018-07-05 PROCEDURE — 71000027 ZZH RECOVERY PHASE 2 EACH 15 MINS: Performed by: OBSTETRICS & GYNECOLOGY

## 2018-07-05 PROCEDURE — 37000008 ZZH ANESTHESIA TECHNICAL FEE, 1ST 30 MIN: Performed by: OBSTETRICS & GYNECOLOGY

## 2018-07-05 PROCEDURE — 27210995 ZZH RX 272: Performed by: OBSTETRICS & GYNECOLOGY

## 2018-07-05 PROCEDURE — 36000056 ZZH SURGERY LEVEL 3 1ST 30 MIN: Performed by: OBSTETRICS & GYNECOLOGY

## 2018-07-05 PROCEDURE — 25000125 ZZHC RX 250: Performed by: NURSE ANESTHETIST, CERTIFIED REGISTERED

## 2018-07-05 PROCEDURE — 84702 CHORIONIC GONADOTROPIN TEST: CPT | Performed by: OBSTETRICS & GYNECOLOGY

## 2018-07-05 PROCEDURE — 36000058 ZZH SURGERY LEVEL 3 EA 15 ADDTL MIN: Performed by: OBSTETRICS & GYNECOLOGY

## 2018-07-05 PROCEDURE — 36415 COLL VENOUS BLD VENIPUNCTURE: CPT | Performed by: OBSTETRICS & GYNECOLOGY

## 2018-07-05 RX ORDER — IBUPROFEN 400 MG/1
800 TABLET, FILM COATED ORAL ONCE
Status: COMPLETED | OUTPATIENT
Start: 2018-07-05 | End: 2018-07-05

## 2018-07-05 RX ORDER — ONDANSETRON 2 MG/ML
INJECTION INTRAMUSCULAR; INTRAVENOUS PRN
Status: DISCONTINUED | OUTPATIENT
Start: 2018-07-05 | End: 2018-07-05

## 2018-07-05 RX ORDER — FENTANYL CITRATE 50 UG/ML
25-50 INJECTION, SOLUTION INTRAMUSCULAR; INTRAVENOUS
Status: DISCONTINUED | OUTPATIENT
Start: 2018-07-05 | End: 2018-07-05 | Stop reason: HOSPADM

## 2018-07-05 RX ORDER — DEXAMETHASONE SODIUM PHOSPHATE 4 MG/ML
INJECTION, SOLUTION INTRA-ARTICULAR; INTRALESIONAL; INTRAMUSCULAR; INTRAVENOUS; SOFT TISSUE PRN
Status: DISCONTINUED | OUTPATIENT
Start: 2018-07-05 | End: 2018-07-05

## 2018-07-05 RX ORDER — PROPOFOL 10 MG/ML
INJECTION, EMULSION INTRAVENOUS CONTINUOUS PRN
Status: DISCONTINUED | OUTPATIENT
Start: 2018-07-05 | End: 2018-07-05

## 2018-07-05 RX ORDER — GLYCOPYRROLATE 0.2 MG/ML
INJECTION, SOLUTION INTRAMUSCULAR; INTRAVENOUS PRN
Status: DISCONTINUED | OUTPATIENT
Start: 2018-07-05 | End: 2018-07-05

## 2018-07-05 RX ORDER — OXYCODONE HYDROCHLORIDE 5 MG/1
5-10 TABLET ORAL
Qty: 12 TABLET | Refills: 0 | Status: SHIPPED | OUTPATIENT
Start: 2018-07-05 | End: 2021-01-11

## 2018-07-05 RX ORDER — SODIUM CHLORIDE, SODIUM LACTATE, POTASSIUM CHLORIDE, CALCIUM CHLORIDE 600; 310; 30; 20 MG/100ML; MG/100ML; MG/100ML; MG/100ML
INJECTION, SOLUTION INTRAVENOUS CONTINUOUS PRN
Status: DISCONTINUED | OUTPATIENT
Start: 2018-07-05 | End: 2018-07-05

## 2018-07-05 RX ORDER — HYDROMORPHONE HYDROCHLORIDE 1 MG/ML
.3-.5 INJECTION, SOLUTION INTRAMUSCULAR; INTRAVENOUS; SUBCUTANEOUS EVERY 5 MIN PRN
Status: DISCONTINUED | OUTPATIENT
Start: 2018-07-05 | End: 2018-07-05 | Stop reason: HOSPADM

## 2018-07-05 RX ORDER — LIDOCAINE HYDROCHLORIDE 20 MG/ML
INJECTION, SOLUTION INFILTRATION; PERINEURAL PRN
Status: DISCONTINUED | OUTPATIENT
Start: 2018-07-05 | End: 2018-07-05

## 2018-07-05 RX ORDER — ALBUTEROL SULFATE 0.83 MG/ML
2.5 SOLUTION RESPIRATORY (INHALATION) EVERY 4 HOURS PRN
Status: DISCONTINUED | OUTPATIENT
Start: 2018-07-05 | End: 2018-07-05 | Stop reason: HOSPADM

## 2018-07-05 RX ORDER — SODIUM CHLORIDE, SODIUM LACTATE, POTASSIUM CHLORIDE, CALCIUM CHLORIDE 600; 310; 30; 20 MG/100ML; MG/100ML; MG/100ML; MG/100ML
INJECTION, SOLUTION INTRAVENOUS CONTINUOUS
Status: DISCONTINUED | OUTPATIENT
Start: 2018-07-05 | End: 2018-07-05 | Stop reason: HOSPADM

## 2018-07-05 RX ORDER — PROPOFOL 10 MG/ML
INJECTION, EMULSION INTRAVENOUS PRN
Status: DISCONTINUED | OUTPATIENT
Start: 2018-07-05 | End: 2018-07-05

## 2018-07-05 RX ORDER — NALOXONE HYDROCHLORIDE 0.4 MG/ML
.1-.4 INJECTION, SOLUTION INTRAMUSCULAR; INTRAVENOUS; SUBCUTANEOUS
Status: DISCONTINUED | OUTPATIENT
Start: 2018-07-05 | End: 2018-07-05 | Stop reason: HOSPADM

## 2018-07-05 RX ORDER — NEOSTIGMINE METHYLSULFATE 1 MG/ML
VIAL (ML) INJECTION PRN
Status: DISCONTINUED | OUTPATIENT
Start: 2018-07-05 | End: 2018-07-05

## 2018-07-05 RX ORDER — ONDANSETRON 4 MG/1
4 TABLET, ORALLY DISINTEGRATING ORAL EVERY 30 MIN PRN
Status: DISCONTINUED | OUTPATIENT
Start: 2018-07-05 | End: 2018-07-05 | Stop reason: HOSPADM

## 2018-07-05 RX ORDER — HYDRALAZINE HYDROCHLORIDE 20 MG/ML
2.5-5 INJECTION INTRAMUSCULAR; INTRAVENOUS EVERY 10 MIN PRN
Status: DISCONTINUED | OUTPATIENT
Start: 2018-07-05 | End: 2018-07-05 | Stop reason: HOSPADM

## 2018-07-05 RX ORDER — FENTANYL CITRATE 50 UG/ML
INJECTION, SOLUTION INTRAMUSCULAR; INTRAVENOUS PRN
Status: DISCONTINUED | OUTPATIENT
Start: 2018-07-05 | End: 2018-07-05

## 2018-07-05 RX ORDER — MAGNESIUM HYDROXIDE 1200 MG/15ML
LIQUID ORAL PRN
Status: DISCONTINUED | OUTPATIENT
Start: 2018-07-05 | End: 2018-07-05 | Stop reason: HOSPADM

## 2018-07-05 RX ORDER — OXYCODONE HYDROCHLORIDE 5 MG/1
5 TABLET ORAL
Status: COMPLETED | OUTPATIENT
Start: 2018-07-05 | End: 2018-07-05

## 2018-07-05 RX ORDER — ONDANSETRON 2 MG/ML
4 INJECTION INTRAMUSCULAR; INTRAVENOUS EVERY 30 MIN PRN
Status: DISCONTINUED | OUTPATIENT
Start: 2018-07-05 | End: 2018-07-05 | Stop reason: HOSPADM

## 2018-07-05 RX ADMIN — ONDANSETRON 4 MG: 2 INJECTION INTRAMUSCULAR; INTRAVENOUS at 09:43

## 2018-07-05 RX ADMIN — FENTANYL CITRATE 50 MCG: 50 INJECTION INTRAMUSCULAR; INTRAVENOUS at 10:12

## 2018-07-05 RX ADMIN — NEOSTIGMINE METHYLSULFATE 4 MG: 1 INJECTION, SOLUTION INTRAVENOUS at 09:41

## 2018-07-05 RX ADMIN — IBUPROFEN 800 MG: 800 TABLET ORAL at 11:21

## 2018-07-05 RX ADMIN — LIDOCAINE HYDROCHLORIDE 80 MG: 20 INJECTION, SOLUTION INFILTRATION; PERINEURAL at 09:05

## 2018-07-05 RX ADMIN — PROPOFOL 300 MG: 10 INJECTION, EMULSION INTRAVENOUS at 09:05

## 2018-07-05 RX ADMIN — SODIUM CHLORIDE 1000 ML: 900 IRRIGANT IRRIGATION at 09:25

## 2018-07-05 RX ADMIN — PROPOFOL 200 MCG/KG/MIN: 10 INJECTION, EMULSION INTRAVENOUS at 09:05

## 2018-07-05 RX ADMIN — SODIUM CHLORIDE, POTASSIUM CHLORIDE, SODIUM LACTATE AND CALCIUM CHLORIDE: 600; 310; 30; 20 INJECTION, SOLUTION INTRAVENOUS at 09:42

## 2018-07-05 RX ADMIN — ROCURONIUM BROMIDE 50 MG: 10 INJECTION INTRAVENOUS at 09:05

## 2018-07-05 RX ADMIN — DEXAMETHASONE SODIUM PHOSPHATE 4 MG: 4 INJECTION, SOLUTION INTRA-ARTICULAR; INTRALESIONAL; INTRAMUSCULAR; INTRAVENOUS; SOFT TISSUE at 09:12

## 2018-07-05 RX ADMIN — OXYCODONE HYDROCHLORIDE 5 MG: 5 TABLET ORAL at 10:49

## 2018-07-05 RX ADMIN — SODIUM CHLORIDE, POTASSIUM CHLORIDE, SODIUM LACTATE AND CALCIUM CHLORIDE: 600; 310; 30; 20 INJECTION, SOLUTION INTRAVENOUS at 09:02

## 2018-07-05 RX ADMIN — GLYCOPYRROLATE 0.6 MG: 0.2 INJECTION, SOLUTION INTRAMUSCULAR; INTRAVENOUS at 09:41

## 2018-07-05 RX ADMIN — FENTANYL CITRATE 50 MCG: 50 INJECTION, SOLUTION INTRAMUSCULAR; INTRAVENOUS at 09:05

## 2018-07-05 RX ADMIN — MIDAZOLAM 2 MG: 1 INJECTION INTRAMUSCULAR; INTRAVENOUS at 09:02

## 2018-07-05 RX ADMIN — FENTANYL CITRATE 50 MCG: 50 INJECTION, SOLUTION INTRAMUSCULAR; INTRAVENOUS at 09:28

## 2018-07-05 RX ADMIN — FENTANYL CITRATE 50 MCG: 50 INJECTION INTRAMUSCULAR; INTRAVENOUS at 10:21

## 2018-07-05 ASSESSMENT — LIFESTYLE VARIABLES: TOBACCO_USE: 0

## 2018-07-05 ASSESSMENT — ENCOUNTER SYMPTOMS
DYSRHYTHMIAS: 0
SEIZURES: 0

## 2018-07-05 ASSESSMENT — COPD QUESTIONNAIRES: COPD: 0

## 2018-07-05 NOTE — IP AVS SNAPSHOT
[facility] Same Day Surgery    [address]    [place] [address]    Phone:  [phone]    Fax:  [phone]                                       After Visit Summary   7/5/2018    Jennifer Stevens    MRN: [id number]           After Visit Summary Signature Page     I have received my discharge instructions, and my questions have been answered. I have discussed any challenges I see with this plan with the nurse or doctor.    ..........................................................................................................................................  Patient/Patient Representative Signature      ..........................................................................................................................................  Patient Representative Print Name and Relationship to Patient    ..................................................               ................................................  Date                                            Time    ..........................................................................................................................................  Reviewed by Signature/Title    ...................................................              ..............................................  Date                                                            Time

## 2018-07-05 NOTE — ANESTHESIA POSTPROCEDURE EVALUATION
Patient: Jennifer Stevens    Procedure(s):  DIAGNOSTIC LAPAROSCOPY   - Wound Class: II-Clean Contaminated    Diagnosis:INCREASING PELVIC PAIN   Diagnosis Additional Information: No value filed.    Anesthesia Type:  General, ETT    Note:  Anesthesia Post Evaluation    Patient location during evaluation: PACU  Patient participation: Able to fully participate in evaluation  Level of consciousness: awake and alert  Pain management: adequate  Airway patency: patent  Cardiovascular status: acceptable, hemodynamically stable and blood pressure returned to baseline  Respiratory status: acceptable and nasal cannula  Hydration status: acceptable  PONV: none     Anesthetic complications: None          Last vitals:  Vitals:    07/05/18 1045 07/05/18 1100 07/05/18 1115   BP: 106/70 (!) 89/66 97/62   Resp: 16 16 16   Temp:      SpO2: 97% 99% 100%         Electronically Signed By: Lizbeth Jacobson MD  July 5, 2018  11:43 AM

## 2018-07-05 NOTE — DISCHARGE INSTRUCTIONS
Same Day Surgery Discharge Instructions for  Sedation and General Anesthesia       It's not unusual to feel dizzy, light-headed or faint for up to 24 hours after surgery or while taking pain medication.  If you have these symptoms: sit for a few minutes before standing and have someone assist you when you get up to walk or use the bathroom.      You should rest and relax for the next 24 hours. We recommend you make arrangements to have an adult stay with you for at least 24 hours after your discharge.  Avoid hazardous and strenuous activity.      DO NOT DRIVE any vehicle or operate mechanical equipment for 24 hours following the end of your surgery.  Even though you may feel normal, your reactions may be affected by the medication you have received.      Do not drink alcoholic beverages for 24 hours following surgery.       Slowly progress to your regular diet as you feel able. It's not unusual to feel nauseated and/or vomit after receiving anesthesia.  If you develop these symptoms, drink clear liquids (apple juice, ginger ale, broth, 7-up, etc. ) until you feel better.  If your nausea and vomiting persists for 24 hours, please notify your surgeon.        All narcotic pain medications, along with inactivity and anesthesia, can cause constipation. Drinking plenty of liquids and increasing fiber intake will help.      For any questions of a medical nature, call your surgeon.      Do not make important decisions for 24 hours.      If you had general anesthesia, you may have a sore throat for a couple of days related to the breathing tube used during surgery.  You may use Cepacol lozenges to help with this discomfort.  If it worsens or if you develop a fever, contact your surgeon.       If you feel your pain is not well managed with the pain medications prescribed by your surgeon, please contact your surgeon's office to let them know so they can address your concerns.     [facility]  D&C OR  Laparoscopy  Discharge Instructions    ACTIVITY:  You may restart normal activities as your abdominal discomfort disappears.  You may expect some discomfort under the ribs and shoulder area for the first 24 hours.  In nearly all cases, this will disappear shortly after the first day.  It is certainly permissible to climb stairs, shower, and do ordinary, quiet activities.  More vigorous activities such as sports, intercourse and work may be resumed in 48-72 hours as seems to befit your situation.    OFFICE VISIT:  Please call a day or two after your surgery to make an appointment in approximately 2 weeks to discuss the results of your surgery and have your check-up.    VAGINAL DISCHARGE:  You may have some bloody vaginal discharge for as long as one week.  Ordinary tampons may be used after 3-4 days.     INCISIONAL CARE: Keep incisions clean and dry for 24 hours.  You may shower tomorrow.  No bathing or swimming for 3-5 days. If you have steri-strips, they should remain in place 3-5 days, after which they can be removed.      TEMPERATURE:  If you develop a temperature elevation of 101  or higher, please call our office immediately.    RESTRICTIONS:  Due to the effects of general anesthesia, please do not drive a car, drink alcoholic beverages, nor operate complex machinery in the first 24 hours following surgery.    PLEASE FEEL FREE TO CALL OUR OFFICE IF ANY QUESTIONS OR PROBLEMS ARISE.    [facility]    Jason Abdi M.D.  Roger Christianson M.D.   Matthew Valentin M.D.  ADAMS Waldron M.D.   Darling Parsons M.D.  Jennifer Denson M.D.  SUMANTH Morales M.D. ADAMS Ponce M.D.  _______________________________________________________________________________                   [address]  [address] [facility] [address]  [address]             [place] [address]  [place] [address]            [phone] (Telephone)                                               [phone] (Telephone)            [phone] (Fax)  [phone] (Fax)            [facility]    **If you have questions or concerns about your procedure,   Call Dr. Valentin at [phone]**    Today you received Ibuprofen 800mg at 11:20am.

## 2018-07-05 NOTE — ANESTHESIA PREPROCEDURE EVALUATION
Procedure: Procedure(s):  LAPAROSCOPY DIAGNOSTIC (GYN)  LAPAROSCOPIC ABLATION ENDOMETRIOSIS  Preop diagnosis: INCREASING PELVIC PAIN     Allergies   Allergen Reactions     Naproxen Other (See Comments), Swelling and Anaphylaxis     Past Medical History:   Diagnosis Date     CRVO (central retinal vein occlusion)      Past Surgical History:   Procedure Laterality Date     SHOULDER SURGERY Left 12/2016    to remove bone spur     TONSILLECTOMY & ADENOIDECTOMY  2002     Prior to Admission medications    Medication Sig Start Date End Date Taking? Authorizing Provider   paragard intrauterine copper 1 each by Intrauterine route once    Reported, Patient     No current Epic-ordered facility-administered medications on file.      Current Outpatient Prescriptions Ordered in Epic   Medication     paragard intrauterine copper     Wt Readings from Last 1 Encounters:   12/09/16 90.4 kg (199 lb 4.8 oz)     Temp Readings from Last 1 Encounters:   12/09/16 37.1  C (98.7  F) (Oral)     BP Readings from Last 6 Encounters:   12/09/16 121/82   10/31/16 124/73     Pulse Readings from Last 4 Encounters:   12/09/16 93   10/31/16 89     Resp Readings from Last 1 Encounters:   10/31/16 16     SpO2 Readings from Last 1 Encounters:   10/31/16 98%     Anesthesia Evaluation     .             ROS/MED HX    ENT/Pulmonary:      (-) tobacco use, asthma, COPD and sleep apnea   Neurologic:      (-) seizures, CVA and migraines   Cardiovascular:        (-) hypertension, CAD, TALAMANTES, arrhythmias, valvular problems/murmurs and dyslipidemia   METS/Exercise Tolerance:  >4 METS   Hematologic:        (-) history of blood clots, anemia and other hematologic disorder   Musculoskeletal:        (-) arthritis   GI/Hepatic:        (-) GERD and liver disease   Renal/Genitourinary:      (-) renal disease and nephrolithiasis   Endo:     (+) Other Endocrine Disorder pelvic pain, endometriosis.      Psychiatric:  - neg psychiatric ROS       Infectious Disease:        (-)  Recent Fever   Malignancy:         Other:                     Physical Exam  Normal systems: cardiovascular, pulmonary and dental    Airway   Mallampati: II  TM distance: >3 FB  Neck ROM: full    Dental     Cardiovascular   Rhythm and rate: regular and normal  (-) no murmur    Pulmonary    breath sounds clear to auscultation                    Anesthesia Plan      History & Physical Review      ASA Status:  2 .    NPO Status:  > 8 hours    Plan for General and ETT with Propofol induction. Maintenance will be Balanced.    PONV prophylaxis:  Ondansetron (or other 5HT-3) and Dexamethasone or Solumedrol  Propofol infusion      Postoperative Care  Postoperative pain management:  Multi-modal analgesia.      Consents  Anesthetic plan, risks, benefits and alternatives discussed with:  Patient..                          .

## 2018-07-05 NOTE — IP AVS SNAPSHOT
MRN:[id number]                      After Visit Summary   7/5/2018    Jennifer Stevens    MRN: [id number]           Thank you!     Thank you for choosing [facility] for your care. Our goal is always to provide you with excellent care. Hearing back from our patients is one way we can continue to improve our services. Please take a few minutes to complete the written survey that you may receive in the mail after you visit with us. Thank you!        Patient Information     Date Of Birth          1995        About your hospital stay     You were admitted on:  July 5, 2018 You last received care in the:  [facility] Day [facility]    You were discharged on:  July 5, 2018       Who to Call     For medical emergencies, please call 911.  For non-urgent questions about your medical care, please call your primary care provider or clinic, None  For questions related to your surgery, please call your surgery clinic        Attending Provider     Provider Matthew Hylton MD OB/Gyn       Primary Care Provider Fax #    Physician No Ref-Primary [phone]      After Care Instructions     Discharge Instructions       Patient to arrange follow up appointment in 3  weeks            Discharge Instructions       Pelvic Rest. No tampons, douching or intercourse for  1  weeks.                  Your next 10 appointments already scheduled     Aug 22, 2018 10:15 AM CDT   RETURN RETINA with Cyndi Sims MD   Eye Clinic ([facility])    [address] [address]   [phone]              Further instructions from your care team       Same Day Surgery Discharge Instructions for  Sedation and General Anesthesia       It's not unusual to feel dizzy, light-headed or faint for up to 24 hours after surgery or while taking pain medication.  If you have these symptoms: sit for a few minutes before  standing and have someone assist you when you get up to walk or use the bathroom.      You should rest and relax for the next 24 hours. We recommend you make arrangements to have an adult stay with you for at least 24 hours after your discharge.  Avoid hazardous and strenuous activity.      DO NOT DRIVE any vehicle or operate mechanical equipment for 24 hours following the end of your surgery.  Even though you may feel normal, your reactions may be affected by the medication you have received.      Do not drink alcoholic beverages for 24 hours following surgery.       Slowly progress to your regular diet as you feel able. It's not unusual to feel nauseated and/or vomit after receiving anesthesia.  If you develop these symptoms, drink clear liquids (apple juice, ginger ale, broth, 7-up, etc. ) until you feel better.  If your nausea and vomiting persists for 24 hours, please notify your surgeon.        All narcotic pain medications, along with inactivity and anesthesia, can cause constipation. Drinking plenty of liquids and increasing fiber intake will help.      For any questions of a medical nature, call your surgeon.      Do not make important decisions for 24 hours.      If you had general anesthesia, you may have a sore throat for a couple of days related to the breathing tube used during surgery.  You may use Cepacol lozenges to help with this discomfort.  If it worsens or if you develop a fever, contact your surgeon.       If you feel your pain is not well managed with the pain medications prescribed by your surgeon, please contact your surgeon's office to let them know so they can address your concerns.     [facility]  D&C OR Laparoscopy  Discharge Instructions    ACTIVITY:  You may restart normal activities as your abdominal discomfort disappears.  You may expect some discomfort under the ribs and shoulder area for the first 24 hours.  In nearly all cases, this will disappear shortly after  the first day.  It is certainly permissible to climb stairs, shower, and do ordinary, quiet activities.  More vigorous activities such as sports, intercourse and work may be resumed in 48-72 hours as seems to befit your situation.    OFFICE VISIT:  Please call a day or two after your surgery to make an appointment in approximately 2 weeks to discuss the results of your surgery and have your check-up.    VAGINAL DISCHARGE:  You may have some bloody vaginal discharge for as long as one week.  Ordinary tampons may be used after 3-4 days.     INCISIONAL CARE: Keep incisions clean and dry for 24 hours.  You may shower tomorrow.  No bathing or swimming for 3-5 days. If you have steri-strips, they should remain in place 3-5 days, after which they can be removed.      TEMPERATURE:  If you develop a temperature elevation of 101  or higher, please call our office immediately.    RESTRICTIONS:  Due to the effects of general anesthesia, please do not drive a car, drink alcoholic beverages, nor operate complex machinery in the first 24 hours following surgery.    PLEASE FEEL FREE TO CALL OUR OFFICE IF ANY QUESTIONS OR PROBLEMS ARISE.    [facility]    Jason Abdi M.D.  Roger Christianson M.D.   Matthew Valentin M.D.  ADAMS Waldron M.D.   Darling Parsons M.D.  Jennifer Denson M.D.  SUMANTH Morales M.D. ADAMS Ponce M.D.  _______________________________________________________________________________                   [facility]              [address] [facility] [address]            [facility] [address]  [place] [address]            [phone] (Telephone)                                               [phone] (Telephone)            [phone] (Fax)  [phone] (Fax)            [facility]                                          "            [facility]    **If you have questions or concerns about your procedure,   Call Dr. Valentin at [phone]**    Today you received Ibuprofen 800mg at 11:20am.     Pending Results     No orders found from 7/3/2018 to 2018.            Admission Information     Date & Time Provider Department Dept. Phone    2018 Matthew Valentin MD [facility] Same Day Surgery [phone]      Your Vitals Were     Blood Pressure Temperature Respirations Height Weight Last Period     97.5  F (36.4  C) (Oral) 16 1.702 m (5' 7\") 100.7 kg (221 lb 14.4 oz) 2018 (Exact Date)    Pulse Oximetry BMI (Body Mass Index)                97% 34.75 kg/m2          MyChart Information     [facility] lets you send messages to your doctor, view your test results, renew your prescriptions, schedule appointments and more. To sign up, go to [email or web address] . Click on \"Log in\" on the left side of the screen, which will take you to the Welcome page. Then click on \"Sign up Now\" on the right side of the page.     You will be asked to enter the access code listed below, as well as some personal information. Please follow the directions to create your username and password.     Your access code is: [email or web address]  Expires: 10/3/2018 10:42 AM     Your access code will  in 90 days. If you need help or a new code, please call your [facility] clinic or [phone].        Care EveryWhere ID     This is your Care EveryWhere ID. This could be used by other organizations to access your [facility] medical records  [id number]        Equal Access to Services     STEPHEN VILLAGOMEZ : Diomedes Jones, mario fong, moo frausto, katharine lau. So [facility] [phone].    ATENCIÓN: Si habla español, tiene a jones disposición servicios gratuitos de asistencia lingüística. Llame al [phone].    We comply with applicable federal civil rights laws " and [place] laws. We do not discriminate on the basis of race, color, national origin, age, disability, sex, sexual orientation, or gender identity.               Review of your medicines      START taking        Dose / Directions    oxyCODONE IR 5 MG tablet   Commonly known as:  ROXICODONE   Used for:  Dysmenorrhea   Notes to Patient:  One tab at 10:50am        Dose:  5-10 mg   Take 1-2 tablets (5-10 mg) by mouth every 3 hours as needed for pain or other (Moderate to Severe)   Quantity:  12 tablet   Refills:  0         CONTINUE these medicines which have NOT CHANGED        Dose / Directions    paragard intrauterine copper        Dose:  1 each   1 each by Intrauterine route once   Refills:  0            Where to get your medicines      Some of these will need a paper prescription and others can be bought over the counter. Ask your nurse if you have questions.     Bring a paper prescription for each of these medications     oxyCODONE IR 5 MG tablet                Protect others around you: Learn how to safely use, store and throw away your medicines at www.disposemymeds.org.        Information about OPIOIDS     PRESCRIPTION OPIOIDS: WHAT YOU NEED TO KNOW   We gave you an opioid (narcotic) pain medicine. It is important to manage your pain, but opioids are not always the best choice. You should first try all the other options your care team gave you. Take this medicine for as short a time (and as few doses) as possible.     These medicines have risks:    DO NOT drive when on new or higher doses of pain medicine. These medicines can affect your alertness and reaction times, and you could be arrested for driving under the influence (DUI). If you need to use opioids long-term, talk to your care team about driving.    DO NOT operate heave machinery    DO NOT do any other dangerous activities while taking these medicines.     DO NOT drink any alcohol while taking these medicines.      If the opioid prescribed includes  acetaminophen, DO NOT take with any other medicines that contain acetaminophen. Read all labels carefully. Look for the word  acetaminophen  or  Tylenol.  Ask your pharmacist if you have questions or are unsure.    You can get addicted to pain medicines, especially if you have a history of addiction (chemical, alcohol or substance dependence). Talk to your care team about ways to reduce this risk.    Store your pills in a secure place, locked if possible. We will not replace any lost or stolen medicine. If you don t finish your medicine, please throw away (dispose) as directed by your pharmacist. The [place] Pollution Control Agency has more information about safe disposal: https://www.pca.[facility].[place].us/living-green/managing-unwanted-medications.     All opioids tend to cause constipation. Drink plenty of water and eat foods that have a lot of fiber, such as fruits, vegetables, prune juice, apple juice and high-fiber cereal. Take a laxative (Miralax, milk of magnesia, Colace, Senna) if you don t move your bowels at least every other day.              Medication List: This is a list of all your medications and when to take them. Check marks below indicate your daily home schedule. Keep this list as a reference.      Medications           Morning Afternoon Evening Bedtime As Needed    oxyCODONE IR 5 MG tablet   Commonly known as:  ROXICODONE   Take 1-2 tablets (5-10 mg) by mouth every 3 hours as needed for pain or other (Moderate to Severe)   Last time this was given:  5 mg on 7/5/2018 10:49 AM   Notes to Patient:  One tab at 10:50am                                paragard intrauterine copper   1 each by Intrauterine route once

## 2018-07-05 NOTE — OP NOTE
Procedure Date: 2018      DATE OF SERVICE: 2018.      PREOPERATIVE DIAGNOSIS:  Worsening dysmenorrhea, dyspareunia.      POSTOPERATIVE DIAGNOSIS:  Worsening dysmenorrhea, dyspareunia.      PROCEDURE:  Laparoscopy.      OPERATIVE PROCEDURE:  The patient was prepped and draped in the lithotomy position under general anesthetic.  Examination at this time revealed the uterus to be slightly bulky and anteverted.  A single-tooth tenaculum was applied to the anterior lip of the cervix in a transverse manner and a cervical cannula inserted into the cervix.  A small incision was made in the infraumbilical fold.  Veress needle was inserted into the peritoneal cavity.  Peritoneal cavity was insufflated with 3 liters of carbon dioxide.  The Veress needle was then removed.  The 5 mm expandable trocar and sheath were then inserted into the incision and the upper abdomen was visualized and found to be normal.  Examination of the pelvis revealed the vesicouterine fold of the peritoneum to be normal.  With the aid of a suprapubic 5 mm accessory trocar site, the rest of the pelvis was visualized.  Both tubes were normal to their pristine fimbriated ends.  Both ovaries were normal on their medial and lateral aspects as were both ovarian fossa.  The cul-de-sac was negative.  Uterosacral ligaments were negative as well.  No abnormalities were detected.  The procedure was then terminated.  The peritoneal cavity was emptied of its gas.  The incisions closed with Steri-Strips.  Total estimated blood loss for the procedure was approximately 10 mL.  The patient left the operating room in good condition.         JAMI OSMAN MD             D: 2018   T: 2018   MT: MARLON      Name:     KANWAL BARRON   MRN:      [id number]        Account:        [id number]   :      1995           Procedure Date: 2018      Document: [id number]

## 2018-07-05 NOTE — ANESTHESIA CARE TRANSFER NOTE
Patient: Jennifer Stevens    Procedure(s):  DIAGNOSTIC LAPAROSCOPY   - Wound Class: II-Clean Contaminated    Diagnosis: INCREASING PELVIC PAIN   Diagnosis Additional Information: No value filed.    Anesthesia Type:   General, ETT     Note:  Airway :Face Mask  Patient transferred to:PACU  Comments: Extubated awake in OR, exchanging well, to recovery, VSSHandoff Report: Identifed the Patient, Identified the Reponsible Provider, Reviewed the pertinent medical history, Discussed the surgical course, Reviewed Intra-OP anesthesia mangement and issues during anesthesia, Set expectations for post-procedure period and Allowed opportunity for questions and acknowledgement of understanding      Vitals: (Last set prior to Anesthesia Care Transfer)    CRNA VITALS  7/5/2018 0925 - 7/5/2018 1001      7/5/2018             Pulse: 83    SpO2: 97 %    Resp Rate (observed): (!)  4                Electronically Signed By: CONNOR Booker CRNA  July 5, 2018  10:01 AM

## 2018-08-08 ENCOUNTER — OFFICE VISIT - HEALTHEAST (OUTPATIENT)
Dept: FAMILY MEDICINE | Facility: CLINIC | Age: 23
End: 2018-08-08

## 2018-08-08 ENCOUNTER — COMMUNICATION - HEALTHEAST (OUTPATIENT)
Dept: TELEHEALTH | Facility: CLINIC | Age: 23
End: 2018-08-08

## 2018-08-08 DIAGNOSIS — K59.00 CONSTIPATION: ICD-10-CM

## 2018-08-08 DIAGNOSIS — K64.8 HEMORRHOIDS, INTERNAL: ICD-10-CM

## 2018-08-22 ENCOUNTER — OFFICE VISIT (OUTPATIENT)
Dept: OPHTHALMOLOGY | Facility: CLINIC | Age: 23
End: 2018-08-22
Attending: OPHTHALMOLOGY
Payer: COMMERCIAL

## 2018-08-22 DIAGNOSIS — H34.8192 CRVO (CENTRAL RETINAL VEIN OCCLUSION) (H): Primary | ICD-10-CM

## 2018-08-22 PROCEDURE — G0463 HOSPITAL OUTPT CLINIC VISIT: HCPCS | Mod: ZF

## 2018-08-22 ASSESSMENT — TONOMETRY
IOP_METHOD: TONOPEN
OD_IOP_MMHG: 18
OS_IOP_MMHG: 14

## 2018-08-22 ASSESSMENT — CONF VISUAL FIELD
OS_NORMAL: 1
METHOD: COUNTING FINGERS
OD_NORMAL: 1

## 2018-08-22 ASSESSMENT — VISUAL ACUITY
METHOD: SNELLEN - LINEAR
OS_PH_CC: 20/25+1
OD_PH_CC: 20/25-3
OS_CC+: +2
OS_CC: 20/30
CORRECTION_TYPE: GLASSES
OD_CC: 20/30

## 2018-08-22 ASSESSMENT — CUP TO DISC RATIO
OD_RATIO: 0.05
OS_RATIO: 0.0

## 2018-08-22 ASSESSMENT — REFRACTION_WEARINGRX
OD_SPHERE: -4.75
OD_AXIS: 146
OS_AXIS: 084
OS_CYLINDER: +0.50
SPECS_TYPE: SVL
OS_SPHERE: -4.75
OD_CYLINDER: +0.50

## 2018-08-22 ASSESSMENT — SLIT LAMP EXAM - LIDS
COMMENTS: NORMAL
COMMENTS: NORMAL

## 2018-08-22 ASSESSMENT — EXTERNAL EXAM - RIGHT EYE: OD_EXAM: NORMAL

## 2018-08-22 ASSESSMENT — EXTERNAL EXAM - LEFT EYE: OS_EXAM: NORMAL

## 2018-08-22 NOTE — LETTER
August 22, 2018      Re: Jennifer Stevens   1995    To Whom It May Concern:    Jennifer Stevens was treated for Central Retinal Vein Occlusion in her left eye. She is now cleared to go back to school.    Thank you for your cooperation in this matter.  Please do not hesitate to contact me if you have any further questions.    Sincerely,      CORINNE COWAN

## 2018-08-22 NOTE — PROGRESS NOTES
"I have confirmed the patient's and reviewed Past Medical History, Past Surgical History, Social History, Family History, Problem List, Medication List and agree with Tech note.      CC - fup central retinal vein occlusion left eye     Interval history  No change in vision    HPI - notices transient decreased vision right eye when covering left,\"like a curtain covering it\" in the far periphery then returns to baseline. Floaters stable. Denies flashes      Past History  Jennifer Stevens is a  21 year old year-old patient with history CRVO OS 11/2016. Lab workup normal, stopped OCPs. Had avastin injection for CME 11/3 with resolution. She states that the floaters are black lines with a gray shadow and intermittent. At any given time, there are 2 floaters and they are visible >15 times daily      CBC- normal, ESR- normal, lupus anticoagulant - negative, PT- normal , PTT- normal, INR- normal, Activated Protein C Resistance - normal, Factor V Leiden- negative, anti-cardiolipin antibody-negative, vasculitis panel (myeloperoxidase antibody & proteinase 3 antibody) - negative, ANCA- negative, RPR-negative, quantiferon gold- negative, WILDA- negative, BMP-within normal limits,                          PAST OCULAR HISTORY  None    FOHx/FMHx: mother with recurrent clots, unclear etiology (mentioned possible Takaysu Arteritis)    RETINAL IMAGING:  OCT  2-21-18  right eye- normal contour, no IRF/SRF, stable  left eye - normal contour, no IRF/SRF    HVF 8/24/17  RE reliable study, nonspecific superior temporal defect  LE reliable study, wnl    US  OD- likely ONH drusen  OS- ?mild ONH drusen    FA 11-1-16  RE: normal    LE: (transits) slowed venous filling, extensive blocking from IRH, late leakage from ONH and in macula, diffuse staining of veins, no obvious ischemia    OVF 30-2 12-27-16  OD - normal likely  OS - diffuse depression, better than prior        ASSESSMENT & PLAN  1. Subjective visual disturbance, both eyes   -ocular " migraine vs PVD   -exam and oct grossly normal   -VA 20/20 OU   -recheck VF 1 year       2.  non ischemic Central Retinal Vein Occlusion OS              - onset 11/1/16, unclear etiology   - stopped OCP per hem-onc              - s/p Avastin x1 11/2016              - likely non-ischemic given excellent vision today              - saw hematology stopped OCP              - no NVI    3.  H/o CME OS              - resolved after Avastin #1 on 11/3/16              - observe today      4.  H/o Pain OS with eye movement              - eval by Dr. Presley for optic neuritis with MRI negative    5. Optic disc drusen   - to consider ON autofluorescence, GINNA OCT    - seen on B scan    6. Dry Eye Syndrome, both eyes                       - encouraged artificial tears 3-4x daily         RTC 9 months for dilated fundus exam         Cyndi Finley MD PhD.  Professor & Chair

## 2018-08-22 NOTE — MR AVS SNAPSHOT
After Visit Summary   8/22/2018    Jennifer Stevens    MRN: [id number]           Patient Information     Date Of Birth          1995        Visit Information        Provider Department      8/22/2018 10:15 AM Cyndi Sims MD [facility]        Today's Diagnoses     CRVO (central retinal vein occlusion)    -  1       Follow-ups after your visit        Follow-up notes from your care team     Return in about 9 months (around 5/22/2019) for crvo OS .      Who to contact     Please call your clinic at [phone] to:    Ask questions about your health    Make or cancel appointments    Discuss your medicines    Learn about your test results    Speak to your doctor            Additional Information About Your Visit        Care EveryWhere ID     This is your Care EveryWhere ID. This could be used by other organizations to access your [facility] medical records  [id number]         Blood Pressure from Last 3 Encounters:   07/05/18 101/69   12/09/16 121/82   10/31/16 124/73    Weight from Last 3 Encounters:   07/05/18 100.7 kg (221 lb 14.4 oz)   12/09/16 90.4 kg (199 lb 4.8 oz)   10/31/16 88.4 kg (194 lb 14.2 oz)              Today, you had the following     No orders found for display       Primary Care Provider Fax #    Physician No Ref-Primary [phone]       No address on file        Equal Access to Services     [facility] Center[facility] : Hadii vic obrieno Sopemaali, waaxda luqadaha, qaybta kaalmada adeegyada, katharine bowman . So [facility] [phone].    ATENCIÓN: Si habla español, tiene a jones disposición servicios gratuitos de asistencia lingüística. Llame al [phone].    We comply with applicable federal civil rights laws and [place] laws. We do not discriminate on the basis of race, color, national origin, age, disability, sex, sexual orientation, or gender identity.            Thank you!     Thank you for choosing EYE CLINIC  for your care. Our goal is always  to provide you with excellent care. Hearing back from our patients is one way we can continue to improve our services. Please take a few minutes to complete the written survey that you may receive in the mail after your visit with us. Thank you!             Your Updated Medication List - Protect others around you: Learn how to safely use, store and throw away your medicines at www.disposemymeds.org.          This list is accurate as of 8/22/18 11:24 AM.  Always use your most recent med list.                   Brand Name Dispense Instructions for use Diagnosis    oxyCODONE IR 5 MG tablet    ROXICODONE    12 tablet    Take 1-2 tablets (5-10 mg) by mouth every 3 hours as needed for pain or other (Moderate to Severe)    Dysmenorrhea       paragard intrauterine copper      1 each by Intrauterine route once

## 2018-08-22 NOTE — NURSING NOTE
Chief Complaints and History of Present Illnesses   Patient presents with     Follow Up For     6 month follow up non ischemic Central Retinal Vein Occlusion OS     HPI    Affected eye(s):  Both   Symptoms:     Floaters   No flashes   No redness   No tearing   Dryness (Comment: Dryness in both eyes, limited relief with drops.)   No itching         Do you have eye pain now?:  No      Comments:  Pt states vision is the same as last visit. No eye pain today. Occasional floaters in BE, no changes.    Rosa RAMIREZ August 22, 2018 10:41 AM

## 2018-09-25 ENCOUNTER — COMMUNICATION - HEALTHEAST (OUTPATIENT)
Dept: FAMILY MEDICINE | Facility: CLINIC | Age: 23
End: 2018-09-25

## 2018-09-25 DIAGNOSIS — K64.8 INTERNAL HEMORRHOID: ICD-10-CM

## 2018-10-15 ENCOUNTER — RECORDS - HEALTHEAST (OUTPATIENT)
Dept: ADMINISTRATIVE | Facility: OTHER | Age: 23
End: 2018-10-15

## 2018-10-16 ENCOUNTER — COMMUNICATION - HEALTHEAST (OUTPATIENT)
Dept: SCHEDULING | Facility: CLINIC | Age: 23
End: 2018-10-16

## 2018-10-18 ENCOUNTER — RECORDS - HEALTHEAST (OUTPATIENT)
Dept: ADMINISTRATIVE | Facility: OTHER | Age: 23
End: 2018-10-18

## 2019-05-10 ENCOUNTER — TELEPHONE (OUTPATIENT)
Dept: OPHTHALMOLOGY | Facility: CLINIC | Age: 24
End: 2019-05-10

## 2019-05-10 NOTE — TELEPHONE ENCOUNTER
Received call for patient she states she has increasing floaters in her right eye that she can barely see through them.  Just started today.   She has had floaters in the past but not this bad and reports that it is always in the left eye.  Also reports pain that is increasing as the day goes on.  I stated with any new floaters and especially with pain we should have her seen as may be associated with inflammation in the eye.  She is hesitant to schedule something today as she does not know if she can get off work.      Gave her triage direct number to contact me after she discusses with her employer.    Nicole Bravo on 5/10/2019 at 9:18 AM

## 2019-05-10 NOTE — TELEPHONE ENCOUNTER
Called patient as she has not returned call to schedule.  She stated symptoms were a lot better and did not want to schedule today.  Also offered appointment tomorrow.  She stated she would see how things go through the weekend and will call if still having difficulties on Monday.  Call sooner if worsening.      Nicole Bravo on 5/10/2019 at 2:19 PM

## 2021-01-11 ENCOUNTER — OFFICE VISIT (OUTPATIENT)
Dept: OPHTHALMOLOGY | Facility: CLINIC | Age: 26
End: 2021-01-11
Attending: OPHTHALMOLOGY
Payer: COMMERCIAL

## 2021-01-11 DIAGNOSIS — H34.8192 CRVO (CENTRAL RETINAL VEIN OCCLUSION) (H): ICD-10-CM

## 2021-01-11 PROCEDURE — G0463 HOSPITAL OUTPT CLINIC VISIT: HCPCS

## 2021-01-11 PROCEDURE — 92134 CPTRZ OPH DX IMG PST SGM RTA: CPT | Performed by: OPHTHALMOLOGY

## 2021-01-11 PROCEDURE — 92014 COMPRE OPH EXAM EST PT 1/>: CPT | Performed by: OPHTHALMOLOGY

## 2021-01-11 ASSESSMENT — TONOMETRY
IOP_METHOD: TONOPEN
OS_IOP_MMHG: 19
OD_IOP_MMHG: 19

## 2021-01-11 ASSESSMENT — REFRACTION_WEARINGRX
OD_AXIS: 146
OS_SPHERE: -4.75
OS_AXIS: 084
OD_SPHERE: -4.75
OD_CYLINDER: +0.50
OS_CYLINDER: +0.50
SPECS_TYPE: SVL

## 2021-01-11 ASSESSMENT — CONF VISUAL FIELD
METHOD: COUNTING FINGERS
OS_NORMAL: 1
OD_NORMAL: 1

## 2021-01-11 ASSESSMENT — VISUAL ACUITY
OS_PH_CC: 20/20
OS_CC: 20/30
OD_CC+: -1
OS_CC+: -1
OD_CC: 20/20
OS_PH_CC+: -2
METHOD: SNELLEN - LINEAR

## 2021-01-11 ASSESSMENT — EXTERNAL EXAM - LEFT EYE: OS_EXAM: NORMAL

## 2021-01-11 ASSESSMENT — CUP TO DISC RATIO
OS_RATIO: 0.0
OD_RATIO: 0.05

## 2021-01-11 ASSESSMENT — EXTERNAL EXAM - RIGHT EYE: OD_EXAM: NORMAL

## 2021-01-11 ASSESSMENT — SLIT LAMP EXAM - LIDS
COMMENTS: NORMAL
COMMENTS: NORMAL

## 2021-01-11 NOTE — PROGRESS NOTES
I have confirmed the patient's and reviewed Past Medical History, Past Surgical History, Social History, Family History, Problem List, Medication List and agree with Tech note.    CC: consult     HPI: patient seen and found to have Vitreomacular traction both eyes.  She is known to me with vitreous syneresis causing subjective floaters and also had a mild Central retinal vein occlusion in 2018      Assessment/plan:   1.  Vitreomacular traction both eyes    - OCT today shows vitreous bases attached to retina both eyes      2.  Floaters both eyes    - no Posterior vitreous detachment (PVD) seen both eyes    - Retinal detachment/retinal tear precautions discussed with patient  Contact clinic immediately for new floaters/flashers or shadows in vision         RTC 2 months or as needed worsening symptoms     Cyndi Finley MD PhD.  Professor & Chair

## 2021-01-11 NOTE — LETTER
1/11/2021       RE: Jennifer Stevens  [address] [facility] [address]     Dear Colleague,    Thank you for referring your patient, Jennifer Stevens, to the [facility] at [facility]. Please see a copy of my visit note below.    I have confirmed the patient's and reviewed Past Medical History, Past Surgical History, Social History, Family History, Problem List, Medication List and agree with Tech note.    CC: consult     HPI: patient seen and found to have Vitreomacular traction both eyes.  She is known to me with vitreous syneresis causing subjective floaters and also had a mild Central retinal vein occlusion in 2018      Assessment/plan:   1.  Vitreomacular traction both eyes    - OCT today shows vitreous bases attached to retina both eyes      2.  Floaters both eyes    - no Posterior vitreous detachment (PVD) seen both eyes    - Retinal detachment/retinal tear precautions discussed with patient  Contact clinic immediately for new floaters/flashers or shadows in vision         RTC 2 months or as needed worsening symptoms     Cyndi Sims MD PhD.  Professor & Chair        Again, thank you for allowing me to participate in the care of your patient.      Sincerely,    Cyndi Sims MD

## 2021-01-11 NOTE — NURSING NOTE
Chief Complaints and History of Present Illnesses   Patient presents with     Central Retinal Vein Occlusion Follow Up     Chief Complaint(s) and History of Present Illness(es)     Central Retinal Vein Occlusion Follow Up     Laterality: left eye    Associated symptoms: floaters and flashes (some LE).  Negative for eye pain and dryness.  Comments: (fatigue: LE)    Pain scale: 0/10              Comments     Jennifer is here to follow up lisbeth CRVO (central retinal vein occlusion) LE.  She was told to come here by a doctor at [facility]  because she now has vitreomacular traction syndrome LE, and he thought it best she be seen here. The floaters have been present for a long time, but the flashes about a month. Jason Mora COT 3:17 PM January 11, 2021

## 2021-06-01 VITALS — WEIGHT: 213.31 LBS | BODY MASS INDEX: 34.43 KG/M2

## 2021-06-01 VITALS — BODY MASS INDEX: 34.61 KG/M2 | WEIGHT: 221 LBS

## 2021-06-16 NOTE — PROGRESS NOTES
"Over 25 minutes spent greater than 50% of this counseling regarding following issues:    Problem List Items Addressed This Visit        Unprioritized    Moderate episode of recurrent major depressive disorder - Primary     Recurrence of depression from adolescence  Entertained possibility of bipolar, patient does have rapid mood swings  However does not describe anything that truly sounds like rocky  Tolerated sertraline in the past  Started sertraline 25 mg upping to 50 mg, follow-up 3-4 weeks, referral for therapy.  Did not address possibility of pregnancy in the near future, has a 2-year-old currently.  Need to do that both her sertraline would be the best choice           Relevant Medications    sertraline (ZOLOFT) 50 MG tablet    Other Relevant Orders    Referral for Psychotherapy Evaluation- Routine    Thyroid Cascade    Binge eating disorder     New behavior with depression  Patient looking for options for help  Recommend because when she has the urge to eat, rule to not eat for 4 5 minutes \"serve the urge\"  Possibly could be addressed with CBT given referral for depression  Consider referral to eating disorder clinic in the future  Could consider topiramate in addition to sertraline though patient of childbearing age         Relevant Medications    sertraline (ZOLOFT) 50 MG tablet      TSH ordered  Depressed Mood  Narrative: goes up and down- then gets very low  Sometime up and down in the same day  Has started binge eating in the last year  Ups are not rocky  ---------------------  Duration:this time- had baby 2 years ago, had post partum depression  Onset: after birth  Timing (constant verses episodic): Patient's symptoms come and go with large mood fluctuations throughout the day but always returns to depression  Recent life stressors/ Triggers: Birth of child, no other stressors reported    Anxiety?  Yes, social anxiety since the depression became severe  Avoiding social activities?  Yes.  She has a " stable partner but generally stays in the house  Physical symptoms: Tired  Sleeping well? Yes- but also sleepy  Thoughts of harming/killing self? no  Severity/ Degree to which it interferes with living: yes  Alcohol or other substances/ history of addiction? no/  Personal history of depression of other mental illness? Yes- see below- yes  Family history of  anxiety of other mental illness? Mom has depression  Any history of rocky? Elevated mood? Maybe at time  Any history of hallucinations/ voices? no  Anything that you have found helpful?  Previously was on sertraline which after causing her to feel very tired for a while, started helping  Current or previous use of antidepressant medications?  Sertraline  Current of previous work with a therapist?  Yes, referred today well  Regular exercise? No-blood boyfriends trying to get her out to do things and knows that she should  PHQ9= 17  Comments: previously treated in childhood  Tough relationship with dad  Had some over the top   Adopted brother passing away seem to trigger it.  Thinks she may have been diagnosed with bipolar but knows that she was treated with Zoloft alone and tolerated it well.  Was never in the hospital.  Eventually, depression got better and she stopped taking medication.  Did fairly well until she had her baby and thinks she suffered from postpartum depression.      Patient also reports that she binge eats over the last few months as well.  Large amounts and does feel guilty afterwards.  She and her boyfriend have been working to try to mitigate this somewhat.  She reports that he has been fairly helpful but wondering if there is anything else she can do.
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
## 2021-06-17 NOTE — PROGRESS NOTES
"Mental Health Visit Note    4/26/2018    Start time: 8:18am    Stop Time: 9:00am   Session # 1 (initial after DA)    Jennifer Stevens is a 22 y.o. female is being seen today for    Chief Complaint   Patient presents with      Follow Up     Patient presented for initial follow up psychotherapy appointment to address symptoms of depression, eating disorder, anxiety and irritability.   .     New symptoms or complaints: Anxiety- especially in social situations.     Functional Impairment:   Personal: 3  Family: 3  Work: 2  Social:3    Clinical assessment of mental status:   Grooming: Well groomed  Attire: Appropriate  Age: Appears Stated  Behavior Towards Examiner: Cooperative  Motor Activity: Within normal   Eye Contact: Appropriate  Mood: Euthymic  Affect: Congruent w/content of speech, Anxious and Depressed  Speech/Language: Within normal and Soft  Attention: Within normal  Concentration: Within normal  Thought Process: Within normal  Thought Content: Hallucinations: Within noraml  Delusions: Within normal  Orientation: X 3  Memory: No Evidence of Impairment  Judgement: No Evidence of Impairment  Estimated Intelligence: Average  Demonstrated Insight: Adequate  Fund of Knowledge: adequate       Suicidal/Homicidal Ideation present: None Reported This Session    Patient's impression of their current status: Patient reports a busy morning at home as she is 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 She stated work is going well and things at home are well. Her mood is \"good\" with some \"down moments,\" but it \"doesn't last long.\" She has been taking Zoloft for 2 weeks and reports it is going well. She contacted [facility] about their eating disorder treatment program, but it does not work with her work schedule. She has been packing her food to bring to work and trying not to snack after dinner. She reports 3 binge episodes in the last 2 weeks where she went to a drive through and snuck food. She notes this is " "significant improvement. She is open to additional support for binge eating if she can find something that works with her schedule. She reports a concern about anxiety, especially in social situations. She reports \"always\" feeling anxious in social setting and that it is getting worse. She tends to over think and often wants to back out of the social engagement. She endorses physical symptoms of sweating and shaking. She doesn't like to go to stores alone and does better if her 2 year old son is present. She reports fear of judgment and criticism and how to respond if someone talks to her. She reports general anxiety that comes with high stress moment and recalled some times from school. She reports anxiety started in middle school. She is sleeping well.       Therapist impression of patients current state: Patient presented for initial follow up psychotherapy session. She arrived late, was well groomed, and oriented. Mood was euthymic and she was soft spoken. She provided relatively short responses and did not come to session with many concerns. Reviewed recommendations from DA and established treatment plan together. She appears to be functioning well at work and at home. She continues to have discord with her father and brother, but has created healthy boundaries for those relationships as her father and brother have their own stressors at the moment. She does not want a goal for interpersonal stress as she is happy with the boundaries she has established in those relationships. Therapist recommended looking into a support group for binge-eating as the outpatient treatment centers don't work for her work schedule. Provided psycho-education on anxiety symptoms as she has not learned about anxiety in past therapy. It appears her past therapy was focused on the diagnosis of bipolar disorder and her relationship with her father. She did not learn any coping skills in past therapy; therefore learning more skills to " use outside of session would be helpful. Patient learned belly breathing today and therapist also modeled some calming mindfulness movements. Patient appeared shy and hesitant to engage in the movements in front of therapist. However, she was paying attention and appeared receptive to trying at home to decrease anxiety. Prior to next session, she plans to practice diaphragm breathing and mindfulness movements to manage anxiety.  Patient completed Mood Disorder Questionnaire (Lifetime) and scored 8: irritability, self-confident, rapid speech, distractible, increased energy, more active, more socially outgoing, risky behavior.      Type of psychotherapeutic technique provided: Insight oriented, Client centered, Solution-focused and Mindfulness, Somatic Experiencing    Progress toward short term goals:Progress as expected, patient presented for initial follow up session to establish therapeutic relationship and goals for treatment.    Review of long term goals: Treatment Plan updated. Plan Effective 04/26/2018-07/26/2018    Diagnosis:   1. Moderate episode of recurrent major depressive disorder    2. Binge eating disorder        Plan and Follow up: Patient is scheduled for follow up psychotherapy every 2 weeks.       Discharge Criteria/Planning: Patient will continue with follow-up until therapy can be discontinued without return of signs and symptoms.    Nataliya Sharma [facility] 4/26/2018

## 2021-06-17 NOTE — PROGRESS NOTES
"  Mental Health Visit Note    5/10/2018    Start time: 8:10am    Stop Time: 9:00am   Session # 2    Jennifer Stevens is a 22 y.o. female is being seen today for    Chief Complaint   Patient presents with     MH Follow Up     Patient presented for follow up psychotherapy appointment to address symptoms of depression, anxiety and binge eating.   .     New symptoms or complaints: feelings of grief with mother's day approaching.    Functional Impairment:   Personal: 3  Family: 3  Work: 2  Social:3    Clinical assessment of mental status:   Grooming: Well groomed  Attire: Appropriate  Age: Appears Stated  Behavior Towards Examiner: Cooperative  Motor Activity: Within normal   Eye Contact: Appropriate  Mood: Euthymic, Sad when talking about loss.  Affect: Congruent w/content of speech, Tearful, Anxious and Depressed  Speech/Language: Within normal and Soft  Attention: Within normal  Concentration: Within normal  Thought Process: Within normal  Thought Content: Hallucinations: Within noraml  Delusions: Within normal  Orientation: X 3  Memory: No Evidence of Impairment  Judgement: No Evidence of Impairment  Estimated Intelligence: Average  Demonstrated Insight: Adequate  Fund of Knowledge: adequate       Suicidal/Homicidal Ideation present: None Reported This Session    Patient's impression of their current status: Patient reports work and home life are going well. She has been doing morning yoga with her son and doing the practices that were reviewed at last session. She finds the breathing to be helpful. She notes her anxiety has been \"pretty good\" even though she has been busy the last 2 weeks. She does express some external stressors regarding a family member with cancer and recently reconnecting with her step father. The contact with her step-father has been positive, although emotional. They have been talking daily and he is now an additional support for her. She reports noticing symptoms of depression once or " twice. Her brother and sister in law are experiencing some stressors that patient reports feeling depressed about. She denies any episodes of binge eating. She is packing lunches and using serving sizes. She is taking Zoloft and feels it is helping.      Therapist impression of patients current state: Patient presented for follow up psychotherapy session. Mood was euthymic and she was soft spoken. Overall, she is showing signs of improvement in anxiety, depression and binge-eating.  She has been planning meals and taking an active role in developing healthier eating habits. Developed insight into how she implemented this sudden change as she has been very successful. She was able to identify that naming the being-eating and admitting in session has helped create this action. Provided positive reinforcement for healthy habits she is developing and no episodes of binging since last session. Good medication adherence. Provided positive reinforcement for patient engaging in morning yoga practices using the mindfulness movements previously discussed. Encouraged patient to continue implementing deep breathing and yoga/calming movements to manage stress. Processed feelings of grief/loss regarding her grandmother's death and Mother's Day this weekend. Discussed ways to honor her grandmother's memory and also normalized feelings of grief.     Type of psychotherapeutic technique provided: Insight oriented, Client centered, CBT and motivational interviewing,     Progress toward short term goals:Progress as expected, improvements with symptoms. Has been doing mindfulness and relaxation practices to help manage anxiety.    Review of long term goals: Not done at today's visit. Plan Effective 04/26/2018-07/26/2018    Diagnosis:   1. Moderate episode of recurrent major depressive disorder    2. Binge eating disorder        Plan and Follow up: Patient is scheduled for follow up psychotherapy every 2 weeks. Patient is scheduled for  appointment on 5/24/18.      Discharge Criteria/Planning: Patient will continue with follow-up until therapy can be discontinued without return of signs and symptoms.    Nataliya Sharma [facility] 5/10/2018

## 2021-06-17 NOTE — PROGRESS NOTES
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 Signature/Supervision/co-signature statement:   MORAIMA Ro 04/09/2018

## 2021-06-19 NOTE — PROGRESS NOTES
Subjective: This 22-year-old female comes in for evaluation regarding her bowels.  She has had some constipation she states that after bowel movements she feels like there is some tissue sticking out and she has to push it back in.    Patient had a recent laparoscopy to evaluate for endometriosis which was negative on 7/2018.  Prior to that she had an abdominal CT scan which was negative other than a benign cyst on the ovary.    Patient had had some right lower quadrant abdominal pain at the time    She does have some irregular bowels mainly constipation.  Also has an IUD in    We discussed constipation issues regarding increasing water fruits and vegetables and taking MiraLAX daily.  She states that when she takes too much she has some diarrhea    Regarding the rectal area she does occasionally get some blood and some itching.        Tobacco status: She  reports that she has never smoked. She has never used smokeless tobacco.    Patient Active Problem List    Diagnosis Date Noted     Moderate episode of recurrent major depressive disorder (H) 03/16/2018     Binge eating disorder 03/16/2018       Current Outpatient Prescriptions   Medication Sig Dispense Refill     hydrocortisone 25 mg suppository Insert one per rectum qhs 12 suppository 0     sertraline (ZOLOFT) 50 MG tablet For the first week, split the tablet and take 25 mg once daily. After one week, take 50 mg once daily. 90 tablet 3     No current facility-administered medications for this visit.        ROS: Review of systems negative other than as outlined above    Objective:    /70 (Patient Site: Left Arm, Patient Position: Sitting, Cuff Size: Adult Large)  Pulse 78  Temp 98.2  F (36.8  C) (Oral)   Resp 14  Wt 221 lb (100.2 kg)  LMP 06/30/2018 (Exact Date)  SpO2 97%  BMI 35.4 kg/m2  Body mass index is 35.4 kg/(m^2).      General appearance no acute distress vital signs are stable afebrile    Abdomen soft bowel sounds normal no guarding or  rebound back without CVA pain    Skin was normal    Rectal exam with anoscope showed evidence of a internal hemorrhoid at the 12 o'clock position.  No other abnormalities noted    Results for orders placed or performed in visit on 03/16/18   Thyroid Cascade   Result Value Ref Range    TSH 1.44 0.30 - 5.00 uIU/mL       Assessment:  1. Hemorrhoids, internal  hydrocortisone 25 mg suppository   2. Constipation       Internal hemorrhoids will treat with hydrocortisone suppository at bedtime for 12 nights    Can use sitz bath during the daytime as well.  If persisting problems see colorectal    Main issue will be to continue to work on the constipation as outlined above    Plan: As noted    This transcription uses voice recognition software, which may contain typographical errors.

## 2021-07-26 ENCOUNTER — HOSPITAL ENCOUNTER (EMERGENCY)
Facility: CLINIC | Age: 26
Discharge: HOME | End: 2021-07-26
Payer: COMMERCIAL

## 2021-07-26 VITALS
DIASTOLIC BLOOD PRESSURE: 87 MMHG | SYSTOLIC BLOOD PRESSURE: 136 MMHG | TEMPERATURE: 98.3 F | OXYGEN SATURATION: 100 % | HEART RATE: 85 BPM | RESPIRATION RATE: 18 BRPM

## 2021-07-26 LAB — GLUCOSE BLDC GLUCOMTR-MCNC: 92 MG/DL (ref 70–99)

## 2021-07-27 NOTE — ED TRIAGE NOTES
Pt reports right hand tremors in the last 2 weeks. Pt reports tremors are intermittent. Reports headache and blurry vision started associated when tremors come but since Friday, blurry vision and hand tremors have been constant. Reports slight relief if she eats otherwise has been constant. Denies being diabetic. ABCs intact. A&Ox3

## 2022-03-21 ENCOUNTER — LAB REQUISITION (OUTPATIENT)
Dept: LAB | Facility: CLINIC | Age: 27
End: 2022-03-21
Payer: COMMERCIAL

## 2022-03-21 PROCEDURE — 88305 TISSUE EXAM BY PATHOLOGIST: CPT | Mod: TC,ORL | Performed by: OBSTETRICS & GYNECOLOGY

## 2022-03-23 LAB
PATH REPORT.COMMENTS IMP SPEC: NORMAL
PATH REPORT.FINAL DX SPEC: NORMAL
PATH REPORT.GROSS SPEC: NORMAL
PATH REPORT.MICROSCOPIC SPEC OTHER STN: NORMAL
PATH REPORT.RELEVANT HX SPEC: NORMAL
PHOTO IMAGE: NORMAL

## 2022-03-23 PROCEDURE — 88305 TISSUE EXAM BY PATHOLOGIST: CPT | Mod: 26 | Performed by: PATHOLOGY

## 2022-10-10 ENCOUNTER — NURSE TRIAGE (OUTPATIENT)
Dept: NURSING | Facility: CLINIC | Age: 27
End: 2022-10-10

## 2022-10-11 NOTE — TELEPHONE ENCOUNTER
"Pt reports passing blood and mucous rectally for past 2-3 hours \"about five times, probably about quarter size or smaller bloody mucous, kind of looked like clots, bright red\". Pt denies hemorrhoids, constipation or fever. Pt reports she has some  abdominal pain for past 2-3 hours. Per pt pain is improving, now \"probably a 4\". Pt reports she does have irritable bowel syndrome so \"never have normal bowel movements but have never had anything like this before\". Symptoms occurred about a half hour after eating two hot dogs \"rest of family did not get sick\". Overall \"feel fine, except feeling bloated and lower abdomen pain in the middle\".     Writer advised pt to be seen in the ER now per protocol.     Pt verbalizes understanding and agrees to plan.     Reason for Disposition    [1] Constant abdominal pain AND [2] present > 2 hours    Additional Information    Negative: Shock suspected (e.g., cold/pale/clammy skin, too weak to stand, low BP, rapid pulse)    Negative: Difficult to awaken or acting confused (e.g., disoriented, slurred speech)    Negative: Passed out (i.e., lost consciousness, collapsed and was not responding)    Negative: [1] Vomiting AND [2] contains red blood or black (\"coffee ground\") material  (Exception: few red streaks in vomit that only happened once)    Negative: Sounds like a life-threatening emergency to the triager    Negative: Diarrhea is main symptom    Negative: Stool color other than brown or tan is main concern  (no bleeding and no melena)    Protocols used: RECTAL BLEEDING-A-AH      "

## 2022-10-20 ENCOUNTER — HOSPITAL ENCOUNTER (EMERGENCY)
Facility: CLINIC | Age: 27
Discharge: HOME | End: 2022-10-20
Payer: COMMERCIAL

## 2022-10-20 VITALS
OXYGEN SATURATION: 98 % | RESPIRATION RATE: 16 BRPM | DIASTOLIC BLOOD PRESSURE: 101 MMHG | HEART RATE: 84 BPM | SYSTOLIC BLOOD PRESSURE: 126 MMHG | TEMPERATURE: 98.6 F

## 2022-10-20 NOTE — ED TRIAGE NOTES
Pt dx with c-dif after admission last week for blood in her stool.  Pt is now on oral vanco and is having increased lower ab pain, left upper ab pain that is wrapping around to her back.     Triage Assessment     Row Name 10/20/22 1256       Triage Assessment (Adult)    Airway WDL WDL       Respiratory WDL    Respiratory WDL WDL

## 2023-08-21 ENCOUNTER — APPOINTMENT (OUTPATIENT)
Dept: CT IMAGING | Facility: CLINIC | Age: 28
End: 2023-08-21
Attending: EMERGENCY MEDICINE
Payer: COMMERCIAL

## 2023-08-21 ENCOUNTER — HOSPITAL ENCOUNTER (EMERGENCY)
Facility: CLINIC | Age: 28
Discharge: HOME | End: 2023-08-21
Attending: EMERGENCY MEDICINE | Admitting: EMERGENCY MEDICINE
Payer: COMMERCIAL

## 2023-08-21 VITALS
TEMPERATURE: 98.2 F | WEIGHT: 240 LBS | DIASTOLIC BLOOD PRESSURE: 99 MMHG | BODY MASS INDEX: 37.59 KG/M2 | RESPIRATION RATE: 15 BRPM | OXYGEN SATURATION: 98 % | SYSTOLIC BLOOD PRESSURE: 131 MMHG | HEART RATE: 61 BPM

## 2023-08-21 DIAGNOSIS — R07.9 CHEST PAIN, UNSPECIFIED TYPE: ICD-10-CM

## 2023-08-21 DIAGNOSIS — Z86.718 PERSONAL HISTORY OF DVT (DEEP VEIN THROMBOSIS): ICD-10-CM

## 2023-08-21 LAB
ALBUMIN UR-MCNC: NEGATIVE MG/DL
ANION GAP SERPL CALCULATED.3IONS-SCNC: 11 MMOL/L (ref 7–15)
APPEARANCE UR: CLEAR
BACTERIA #/AREA URNS HPF: ABNORMAL /HPF
BASOPHILS # BLD AUTO: 0 10E3/UL (ref 0–0.2)
BASOPHILS NFR BLD AUTO: 1 %
BILIRUB UR QL STRIP: NEGATIVE
BUN SERPL-MCNC: 10.3 MG/DL (ref 6–20)
CALCIUM SERPL-MCNC: 9.3 MG/DL (ref 8.6–10)
CHLORIDE SERPL-SCNC: 103 MMOL/L (ref 98–107)
COLOR UR AUTO: ABNORMAL
CREAT SERPL-MCNC: 0.66 MG/DL (ref 0.51–0.95)
DEPRECATED HCO3 PLAS-SCNC: 25 MMOL/L (ref 22–29)
EOSINOPHIL # BLD AUTO: 0.2 10E3/UL (ref 0–0.7)
EOSINOPHIL NFR BLD AUTO: 3 %
ERYTHROCYTE [DISTWIDTH] IN BLOOD BY AUTOMATED COUNT: 11.9 % (ref 10–15)
GFR SERPL CREATININE-BSD FRML MDRD: >90 ML/MIN/1.73M2
GLUCOSE BLDC GLUCOMTR-MCNC: 79 MG/DL (ref 70–99)
GLUCOSE SERPL-MCNC: 94 MG/DL (ref 70–99)
GLUCOSE UR STRIP-MCNC: NEGATIVE MG/DL
HCG UR QL: NEGATIVE
HCT VFR BLD AUTO: 36.7 % (ref 35–47)
HGB BLD-MCNC: 12.8 G/DL (ref 11.7–15.7)
HGB UR QL STRIP: NEGATIVE
HOLD SPECIMEN: NORMAL
IMM GRANULOCYTES # BLD: 0 10E3/UL
IMM GRANULOCYTES NFR BLD: 0 %
KETONES UR STRIP-MCNC: NEGATIVE MG/DL
LEUKOCYTE ESTERASE UR QL STRIP: NEGATIVE
LYMPHOCYTES # BLD AUTO: 2 10E3/UL (ref 0.8–5.3)
LYMPHOCYTES NFR BLD AUTO: 34 %
MCH RBC QN AUTO: 31.1 PG (ref 26.5–33)
MCHC RBC AUTO-ENTMCNC: 34.9 G/DL (ref 31.5–36.5)
MCV RBC AUTO: 89 FL (ref 78–100)
MONOCYTES # BLD AUTO: 0.4 10E3/UL (ref 0–1.3)
MONOCYTES NFR BLD AUTO: 6 %
NEUTROPHILS # BLD AUTO: 3.3 10E3/UL (ref 1.6–8.3)
NEUTROPHILS NFR BLD AUTO: 56 %
NITRATE UR QL: NEGATIVE
NRBC # BLD AUTO: 0 10E3/UL
NRBC BLD AUTO-RTO: 0 /100
NT-PROBNP SERPL-MCNC: 36 PG/ML (ref 0–450)
PH UR STRIP: 7 [PH] (ref 5–7)
PLATELET # BLD AUTO: 318 10E3/UL (ref 150–450)
POTASSIUM SERPL-SCNC: 3.7 MMOL/L (ref 3.4–5.3)
RBC # BLD AUTO: 4.11 10E6/UL (ref 3.8–5.2)
RBC URINE: 0 /HPF
SODIUM SERPL-SCNC: 139 MMOL/L (ref 136–145)
SP GR UR STRIP: 1 (ref 1–1.03)
SQUAMOUS EPITHELIAL: 1 /HPF
TROPONIN T SERPL HS-MCNC: <6 NG/L
UROBILINOGEN UR STRIP-MCNC: NORMAL MG/DL
WBC # BLD AUTO: 5.9 10E3/UL (ref 4–11)
WBC URINE: 1 /HPF

## 2023-08-21 PROCEDURE — 36415 COLL VENOUS BLD VENIPUNCTURE: CPT | Performed by: EMERGENCY MEDICINE

## 2023-08-21 PROCEDURE — 250N000009 HC RX 250: Performed by: EMERGENCY MEDICINE

## 2023-08-21 PROCEDURE — 250N000013 HC RX MED GY IP 250 OP 250 PS 637: Performed by: EMERGENCY MEDICINE

## 2023-08-21 PROCEDURE — 250N000011 HC RX IP 250 OP 636: Performed by: EMERGENCY MEDICINE

## 2023-08-21 PROCEDURE — 99285 EMERGENCY DEPT VISIT HI MDM: CPT | Mod: 25

## 2023-08-21 PROCEDURE — 80048 BASIC METABOLIC PNL TOTAL CA: CPT | Performed by: EMERGENCY MEDICINE

## 2023-08-21 PROCEDURE — 82962 GLUCOSE BLOOD TEST: CPT

## 2023-08-21 PROCEDURE — 93005 ELECTROCARDIOGRAM TRACING: CPT

## 2023-08-21 PROCEDURE — 81001 URINALYSIS AUTO W/SCOPE: CPT | Performed by: EMERGENCY MEDICINE

## 2023-08-21 PROCEDURE — 71275 CT ANGIOGRAPHY CHEST: CPT

## 2023-08-21 PROCEDURE — 85018 HEMOGLOBIN: CPT | Performed by: EMERGENCY MEDICINE

## 2023-08-21 PROCEDURE — 81025 URINE PREGNANCY TEST: CPT | Performed by: EMERGENCY MEDICINE

## 2023-08-21 PROCEDURE — 83880 ASSAY OF NATRIURETIC PEPTIDE: CPT | Performed by: EMERGENCY MEDICINE

## 2023-08-21 PROCEDURE — 84484 ASSAY OF TROPONIN QUANT: CPT | Performed by: EMERGENCY MEDICINE

## 2023-08-21 RX ORDER — IOPAMIDOL 755 MG/ML
500 INJECTION, SOLUTION INTRAVASCULAR ONCE
Status: COMPLETED | OUTPATIENT
Start: 2023-08-21 | End: 2023-08-21

## 2023-08-21 RX ADMIN — SODIUM CHLORIDE 60 ML: 9 INJECTION, SOLUTION INTRAVENOUS at 12:59

## 2023-08-21 RX ADMIN — RIVAROXABAN 15 MG: 15 TABLET, FILM COATED ORAL at 17:42

## 2023-08-21 RX ADMIN — IOPAMIDOL 73 ML: 755 INJECTION, SOLUTION INTRAVENOUS at 12:59

## 2023-08-21 ASSESSMENT — ACTIVITIES OF DAILY LIVING (ADL)
ADLS_ACUITY_SCORE: 35

## 2023-08-21 NOTE — ED TRIAGE NOTES
Pt reports that she had a hysterectomy 3 wks ago, went to [facility] on Friday due to right leg clot, not on thinners due to not getting script. PT reports that since she has noted neck lymph node swelling, pt reports that she has had a headache since discharge Friday, last night pt reports that she was having word finding difficulty last night at around 2000. VSS and ABC's intact

## 2023-08-21 NOTE — ED PROVIDER NOTES
History     Chief Complaint:  Chest Pain       HPI   Jennifer Stevens is a 27 year old female with history of asthma who presents with chest pain and shortness of breath which worsen with exertion. Here in the ED her chest pain became more sharp and her shortness of breath feels similar to past asthma. Patient does not use inhalers and denies wheezing. Notes nausea, loss of appetite, and confusion today which was noted by her partner and co workers. She also notes headaches since Saturday and redness in her eyes. She has recently had leg swelling but has no swelling today. Endorses history of retinal vein occlusion due to birth control use. Notes family history of blood clots and heart disease. She denies personal history of diabetes and smoking.     Patient has not had any more doses of blood thinner since being discharged for a blood clot due to her pharmacy not having eliquis in stock.     Independent Historian:    None     Review of External Notes:  Patient had a Hysterectomy on 7/28/23 and was seen on 8/18/23 at [facility] for a right leg blood clot. She not found to have a clot in her lungs and was sent home after a dose of eliquis.       Medications:    Eliquis     Past Medical History:    central retinal vein occlusion  Depression   Anxiety   Asthma     Past Surgical History:    Tonsillectomy and adenoidectomy   Hysterectomy       Physical Exam   Patient Vitals for the past 24 hrs:   BP Temp Temp src Pulse Resp SpO2 Weight   08/21/23 1601 -- -- -- 61 15 98 % --   08/21/23 1546 -- -- -- 62 25 97 % --   08/21/23 1531 -- -- -- 67 16 95 % --   08/21/23 1516 (!) 131/99 -- -- 78 11 98 % --   08/21/23 1118 (!) 179/93 98.2  F (36.8  C) Temporal 81 18 98 % 108.9 kg (240 lb)        Physical Exam  General: Alert, no acute distress; well appearing  Neuro:  PERRL.  EOMI.  Gait stable, no focal deficits  HEENT:  Moist mucous membranes.  Conjunctiva normal.  CV:  RRR, no m/r/g, skin warm and well perfused  Pulm:   CTAB, no wheezes/ronchi/rales.  No acute distress, breathing comfortably  GI:  Soft, nontender, nondistended.  No rebound or guarding.  Normal bowel sounds  MSK:  Moving all extremities.  No focal areas of edema, erythema  Skin:  WWP, no rashes, no lower extremity edema, skin color normal, no diaphoresis  Psych:  Well-appearing, normal affect, regular speech    Emergency Department Course   ECG  ECG results from 08/21/23   EKG 12-lead, tracing only     Value    Systolic Blood Pressure     Diastolic Blood Pressure     Ventricular Rate 69    Atrial Rate 69    ND Interval 150    QRS Duration 106        QTc 443    P Axis 21    R AXIS 37    T Axis 15    Interpretation ECG      Sinus rhythm  Incomplete right bundle branch block  Borderline ECG  No previous ECGs available          Imaging:  CT Chest Pulmonary Embolism w Contrast   Final Result   IMPRESSION:   1.  No evidence of pulmonary embolus or other acute abnormality in the   chest.      DIANE WORKMAN MD            SYSTEM ID:  [id number]        Report per radiology    Laboratory:  Labs Ordered and Resulted from Time of ED Arrival to Time of ED Departure   ROUTINE UA WITH MICROSCOPIC REFLEX TO CULTURE - Abnormal       Result Value    Color Urine Straw      Appearance Urine Clear      Glucose Urine Negative      Bilirubin Urine Negative      Ketones Urine Negative      Specific Gravity Urine 1.004      Blood Urine Negative      pH Urine 7.0      Protein Albumin Urine Negative      Urobilinogen Urine Normal      Nitrite Urine Negative      Leukocyte Esterase Urine Negative      Bacteria Urine Few (*)     RBC Urine 0      WBC Urine 1      Squamous Epithelials Urine 1     GLUCOSE BY METER - Normal    GLUCOSE BY METER POCT 79     BASIC METABOLIC PANEL - Normal    Sodium 139      Potassium 3.7      Chloride 103      Carbon Dioxide (CO2) 25      Anion Gap 11      Urea Nitrogen 10.3      Creatinine 0.66      Calcium 9.3      Glucose 94      GFR Estimate >90     TROPONIN  T, HIGH SENSITIVITY - Normal    Troponin T, High Sensitivity <6     NT PROBNP INPATIENT - Normal    N terminal Pro BNP Inpatient 36     HCG QUALITATIVE URINE - Normal    hCG Urine Qualitative Negative     CBC WITH PLATELETS AND DIFFERENTIAL    WBC Count 5.9      RBC Count 4.11      Hemoglobin 12.8      Hematocrit 36.7      MCV 89      MCH 31.1      MCHC 34.9      RDW 11.9      Platelet Count 318      % Neutrophils 56      % Lymphocytes 34      % Monocytes 6      % Eosinophils 3      % Basophils 1      % Immature Granulocytes 0      NRBCs per 100 WBC 0      Absolute Neutrophils 3.3      Absolute Lymphocytes 2.0      Absolute Monocytes 0.4      Absolute Eosinophils 0.2      Absolute Basophils 0.0      Absolute Immature Granulocytes 0.0      Absolute NRBCs 0.0            Emergency Department Course & Assessments:  Interventions:  Medications   CT Saline Flush (60 mLs Intravenous $Given 23 1259)   iopamidol (ISOVUE-370) solution 500 mL (73 mLs Intravenous $Given 23 1259)   rivaroxaban ANTICOAGULANT (XARELTO) tablet 15 mg (15 mg Oral $Given 23 8920)        Assessments/Consultations/Discussion of Management or Tests:   1710 I obtained history and examined the patient as noted above.     Independent Interpretation (X-rays, CTs, rhythm strip):  None    Social Determinants of Health affecting care:  None      Disposition:  The patient was discharged to home.     Impression & Plan    Medical Decision Makin-year-old female with history of hysterectomy  and recent diagnosis of right DVT from [facility]  not on current anticoagulation presenting to the ER for evaluation of chest pain.  Please see above for details of HPI and exam.  Broad differential was considered including  PE, ACS, acute aortic dissection, pneumothorax, pneumonia, musculoskeletal etiology, referred GI etiology amongst other things.  EKG shows no acute ischemic appearing changes.  High-sensitivity troponin is normal.  Patient's  HEART score is low.  The rest of the patient's basic lab studies including BNP obtained from triage were normal.  UA is unremarkable.  CT PE study fortunately negative for PE or other acute pathology.  Patient also reports having a headache since Saturday.  She is otherwise neuro intact.  No signs/symptoms concerning for SAH/ICH, CVT, stroke/TIA, intracranial mass, infectious process such as meningitis/encephalitis.  Suspect primary headache syndrome such as tension headache.  I do not feel advanced imaging or LP is indicated.  Unclear cause for patient's chest pain but fortunately her work-up here is reassuring.  In regards to her RLE DVT, I am unable to see the ultrasound pictures or report from [facility]. No signs of cerulea dolens.  They reportedly sent her home on Eliquis but her pharmacy did not have this.  After discussion of options for anticoagulation as well as risks/benefits, patient elects to start DOAC Xarelto.  She was given first dose here.  She is not in respiratory distress or hypoxic.  Overall I feel that she is safe to discharge home.  She was E prescribed Xarelto and was also given a paper prescription in the event her pharmacy did not carry this.  We will have her follow-up with her PCP regarding her ER visit today and for additional Xarelto due to likely provoked DVT from recent surgery.  She is comfortable with this plan.  Discussed return precautions for the emergency department.  All questions were answered prior to discharge.      Diagnosis:    ICD-10-CM    1. Chest pain, unspecified type  R07.9       2. Personal history of DVT (deep vein thrombosis)  Z86.718              Scribe Disclosure:  I, Jordan Welch, am serving as a scribe at 5:24 PM on 8/21/2023 to document services personally performed by Christopher Joe MD based on my observations and the provider's statements to me.  8/21/2023   Christopher Joe MD [place], Edgar Ronald, MD  08/22/23 0018

## 2023-08-21 NOTE — LETTER
August 21, 2023      To Whom It May Concern:      Jennifer Stevens was seen in our Emergency Department today, 08/21/23.  I expect her condition to improve over the next 1 days.  She can return to work on 8/22/23      Edith Majano RN

## 2023-08-22 LAB
ATRIAL RATE - MUSE: 69 BPM
DIASTOLIC BLOOD PRESSURE - MUSE: NORMAL MMHG
INTERPRETATION ECG - MUSE: NORMAL
P AXIS - MUSE: 21 DEGREES
PR INTERVAL - MUSE: 150 MS
QRS DURATION - MUSE: 106 MS
QT - MUSE: 414 MS
QTC - MUSE: 443 MS
R AXIS - MUSE: 37 DEGREES
SYSTOLIC BLOOD PRESSURE - MUSE: NORMAL MMHG
T AXIS - MUSE: 15 DEGREES
VENTRICULAR RATE- MUSE: 69 BPM

## 2023-11-14 ENCOUNTER — APPOINTMENT (OUTPATIENT)
Dept: MRI IMAGING | Facility: CLINIC | Age: 28
End: 2023-11-14
Attending: EMERGENCY MEDICINE
Payer: COMMERCIAL

## 2023-11-14 ENCOUNTER — APPOINTMENT (OUTPATIENT)
Dept: CT IMAGING | Facility: CLINIC | Age: 28
End: 2023-11-14
Attending: EMERGENCY MEDICINE
Payer: COMMERCIAL

## 2023-11-14 ENCOUNTER — HOSPITAL ENCOUNTER (EMERGENCY)
Facility: CLINIC | Age: 28
Discharge: HOME | End: 2023-11-14
Attending: EMERGENCY MEDICINE | Admitting: EMERGENCY MEDICINE
Payer: COMMERCIAL

## 2023-11-14 VITALS
SYSTOLIC BLOOD PRESSURE: 120 MMHG | DIASTOLIC BLOOD PRESSURE: 64 MMHG | HEART RATE: 88 BPM | RESPIRATION RATE: 18 BRPM | OXYGEN SATURATION: 98 % | TEMPERATURE: 98.5 F

## 2023-11-14 DIAGNOSIS — G51.0 BELL'S PALSY: ICD-10-CM

## 2023-11-14 LAB
ANION GAP SERPL CALCULATED.3IONS-SCNC: 12 MMOL/L (ref 7–15)
APTT PPP: 37 SECONDS (ref 22–38)
BASOPHILS # BLD AUTO: 0 10E3/UL (ref 0–0.2)
BASOPHILS NFR BLD AUTO: 1 %
BUN SERPL-MCNC: 10 MG/DL (ref 6–20)
CALCIUM SERPL-MCNC: 9.3 MG/DL (ref 8.6–10)
CHLORIDE SERPL-SCNC: 102 MMOL/L (ref 98–107)
CREAT SERPL-MCNC: 0.7 MG/DL (ref 0.51–0.95)
DEPRECATED HCO3 PLAS-SCNC: 24 MMOL/L (ref 22–29)
EGFRCR SERPLBLD CKD-EPI 2021: >90 ML/MIN/1.73M2
EOSINOPHIL # BLD AUTO: 0.1 10E3/UL (ref 0–0.7)
EOSINOPHIL NFR BLD AUTO: 1 %
ERYTHROCYTE [DISTWIDTH] IN BLOOD BY AUTOMATED COUNT: 11.6 % (ref 10–15)
GLUCOSE BLDC GLUCOMTR-MCNC: 93 MG/DL (ref 70–99)
GLUCOSE SERPL-MCNC: 91 MG/DL (ref 70–99)
HCT VFR BLD AUTO: 37.3 % (ref 35–47)
HGB BLD-MCNC: 13 G/DL (ref 11.7–15.7)
IMM GRANULOCYTES # BLD: 0 10E3/UL
IMM GRANULOCYTES NFR BLD: 0 %
INR PPP: 1.05 (ref 0.85–1.15)
LYMPHOCYTES # BLD AUTO: 2.8 10E3/UL (ref 0.8–5.3)
LYMPHOCYTES NFR BLD AUTO: 33 %
MCH RBC QN AUTO: 31.5 PG (ref 26.5–33)
MCHC RBC AUTO-ENTMCNC: 34.9 G/DL (ref 31.5–36.5)
MCV RBC AUTO: 90 FL (ref 78–100)
MONOCYTES # BLD AUTO: 0.6 10E3/UL (ref 0–1.3)
MONOCYTES NFR BLD AUTO: 7 %
NEUTROPHILS # BLD AUTO: 5.1 10E3/UL (ref 1.6–8.3)
NEUTROPHILS NFR BLD AUTO: 58 %
NRBC # BLD AUTO: 0 10E3/UL
NRBC BLD AUTO-RTO: 0 /100
PLATELET # BLD AUTO: 328 10E3/UL (ref 150–450)
POTASSIUM SERPL-SCNC: 3.7 MMOL/L (ref 3.4–5.3)
RBC # BLD AUTO: 4.13 10E6/UL (ref 3.8–5.2)
SODIUM SERPL-SCNC: 138 MMOL/L (ref 135–145)
TROPONIN T SERPL HS-MCNC: <6 NG/L
WBC # BLD AUTO: 8.6 10E3/UL (ref 4–11)

## 2023-11-14 PROCEDURE — 96376 TX/PRO/DX INJ SAME DRUG ADON: CPT | Mod: 59

## 2023-11-14 PROCEDURE — 250N000011 HC RX IP 250 OP 636: Performed by: EMERGENCY MEDICINE

## 2023-11-14 PROCEDURE — 250N000009 HC RX 250: Performed by: EMERGENCY MEDICINE

## 2023-11-14 PROCEDURE — 84484 ASSAY OF TROPONIN QUANT: CPT | Performed by: EMERGENCY MEDICINE

## 2023-11-14 PROCEDURE — 85610 PROTHROMBIN TIME: CPT | Performed by: EMERGENCY MEDICINE

## 2023-11-14 PROCEDURE — 255N000002 HC RX 255 OP 636: Performed by: EMERGENCY MEDICINE

## 2023-11-14 PROCEDURE — 0042T CT HEAD PERFUSION W CONTRAST: CPT

## 2023-11-14 PROCEDURE — 85004 AUTOMATED DIFF WBC COUNT: CPT | Performed by: EMERGENCY MEDICINE

## 2023-11-14 PROCEDURE — 70553 MRI BRAIN STEM W/O & W/DYE: CPT

## 2023-11-14 PROCEDURE — 93005 ELECTROCARDIOGRAM TRACING: CPT

## 2023-11-14 PROCEDURE — 82962 GLUCOSE BLOOD TEST: CPT

## 2023-11-14 PROCEDURE — 70496 CT ANGIOGRAPHY HEAD: CPT

## 2023-11-14 PROCEDURE — 99285 EMERGENCY DEPT VISIT HI MDM: CPT | Mod: 25

## 2023-11-14 PROCEDURE — 70498 CT ANGIOGRAPHY NECK: CPT

## 2023-11-14 PROCEDURE — 70450 CT HEAD/BRAIN W/O DYE: CPT

## 2023-11-14 PROCEDURE — A9585 GADOBUTROL INJECTION: HCPCS | Performed by: EMERGENCY MEDICINE

## 2023-11-14 PROCEDURE — 80048 BASIC METABOLIC PNL TOTAL CA: CPT | Performed by: EMERGENCY MEDICINE

## 2023-11-14 PROCEDURE — 36415 COLL VENOUS BLD VENIPUNCTURE: CPT | Performed by: EMERGENCY MEDICINE

## 2023-11-14 PROCEDURE — 96374 THER/PROPH/DIAG INJ IV PUSH: CPT | Mod: 59

## 2023-11-14 PROCEDURE — 85730 THROMBOPLASTIN TIME PARTIAL: CPT | Performed by: EMERGENCY MEDICINE

## 2023-11-14 RX ORDER — LIGHT MINERAL OIL, WHITE PETROLATUM 150; 850 MG/G; MG/G
OINTMENT OPHTHALMIC
Qty: 1 G | Refills: 0 | Status: SHIPPED | OUTPATIENT
Start: 2023-11-14

## 2023-11-14 RX ORDER — VALACYCLOVIR HYDROCHLORIDE 1 G/1
1000 TABLET, FILM COATED ORAL 3 TIMES DAILY
Qty: 21 TABLET | Refills: 0 | Status: SHIPPED | OUTPATIENT
Start: 2023-11-14 | End: 2023-11-21

## 2023-11-14 RX ORDER — IOPAMIDOL 755 MG/ML
120 INJECTION, SOLUTION INTRAVASCULAR ONCE
Status: COMPLETED | OUTPATIENT
Start: 2023-11-14 | End: 2023-11-14

## 2023-11-14 RX ORDER — LORAZEPAM 2 MG/ML
1 INJECTION INTRAMUSCULAR ONCE
Status: COMPLETED | OUTPATIENT
Start: 2023-11-14 | End: 2023-11-14

## 2023-11-14 RX ORDER — GADOBUTROL 604.72 MG/ML
11 INJECTION INTRAVENOUS ONCE
Status: COMPLETED | OUTPATIENT
Start: 2023-11-14 | End: 2023-11-14

## 2023-11-14 RX ORDER — PREDNISONE 20 MG/1
60 TABLET ORAL DAILY
Qty: 21 TABLET | Refills: 0 | Status: SHIPPED | OUTPATIENT
Start: 2023-11-14 | End: 2023-11-21

## 2023-11-14 RX ADMIN — LORAZEPAM 1 MG: 2 INJECTION INTRAMUSCULAR; INTRAVENOUS at 18:37

## 2023-11-14 RX ADMIN — SODIUM CHLORIDE 95 ML: 9 INJECTION, SOLUTION INTRAVENOUS at 17:46

## 2023-11-14 RX ADMIN — IOPAMIDOL 117 ML: 755 INJECTION, SOLUTION INTRAVENOUS at 17:45

## 2023-11-14 RX ADMIN — GADOBUTROL 11 ML: 604.72 INJECTION INTRAVENOUS at 19:02

## 2023-11-14 RX ADMIN — LORAZEPAM 1 MG: 2 INJECTION INTRAMUSCULAR; INTRAVENOUS at 19:14

## 2023-11-14 ASSESSMENT — ACTIVITIES OF DAILY LIVING (ADL): ADLS_ACUITY_SCORE: 35

## 2023-11-14 NOTE — CONSULTS
[facility]    Stroke Telephone Note    I was called by Paul Corea on 11/14/23 regarding patient Jennifer Stevens.  28-year-old female with past medical history of CRVO 2018, postop DVT for which she was on Eliquis and that was discontinued November 1 was last known well yesterday around 5 PM when family noticed left facial droop.  She continue to monitor however today at work started having difficulty using her left arm.  Initially she thought that this was related to her chronic shoulder problem in the left when it continued she decided to seek evaluation.  Per EDMD exam positive left facial droop, no drift in the left upper extremity however decreased left shoulder elevation.  /84    Vitals  BP: (!) 153/84   Pulse: 91   Resp: 18   Temp: 98.5  F (36.9  C)         Stroke Code Data (for stroke code without tele)  Stroke code activated 11/14/23   1734   Stroke provider first response  11/14/23   1739     Last known normal 11/13/23   1700        Time of discovery   (or onset of symptoms) 11/13/23   1701   Head CT read by Stroke Neuro Dr/Provider 11/14/23   1752   Was stroke code de-escalated? Yes 11/14/23 1812     Imaging Findings  CT head: no evidence of acute ischemic stroke or intracranial bleed  CTA head and neck: no evidence of large vessel occlusion of critical stenosis  CT perfusion: No area of perfusion deficit.  Intravenous Thrombolysis  Not given due to:   - minor/isolated/quickly resolving symptoms    Endovascular Treatment  Not initiated due to absence of proximal vessel occlusion    Impression  New left facial droop, ?  Left arm weakness unclear etiology at this time we will get further work-up to rule out acute ischemic stroke.    Recommendations   -Recommend MRI brain with and without contrast.  - Further recommendations pending MRI    Case discussed with vascular neurology attending Dr. Mauricio    My recommendations are based on the information provided over the phone by  "Jennifer Stevens's in-person providers. They are not intended to replace the clinical judgment of her in-person providers. I was not requested to personally see or examine the patient at this time.    The Stroke Staff is Dr. Mauricio.    Lyric Tamayo MD  Vascular Neurology Fellow    To page me or covering stroke neurology team member, click here: [facility]  Choose \"On Call\" tab at top, then select \"NEUROLOGY/ALL SITES\" from middle drop-down box, press Enter, then look for \"stroke\" or \"telestroke\" for your site.    "

## 2023-11-14 NOTE — ED TRIAGE NOTES
Pt noticed left sided facial weakness last night, earlier today noticed it looked asymmetrical, facial droop noted to left side of mouth. Discontinued eliquis this month. Previous DVT. Left arm weakness began today at work. A&Ox4.
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
## 2023-11-15 LAB
ATRIAL RATE - MUSE: 81 BPM
DIASTOLIC BLOOD PRESSURE - MUSE: NORMAL MMHG
INTERPRETATION ECG - MUSE: NORMAL
P AXIS - MUSE: 9 DEGREES
PR INTERVAL - MUSE: 160 MS
QRS DURATION - MUSE: 110 MS
QT - MUSE: 398 MS
QTC - MUSE: 462 MS
R AXIS - MUSE: 46 DEGREES
SYSTOLIC BLOOD PRESSURE - MUSE: NORMAL MMHG
T AXIS - MUSE: 6 DEGREES
VENTRICULAR RATE- MUSE: 81 BPM

## 2023-11-15 NOTE — DISCHARGE INSTRUCTIONS
Tape your eye shut to prevent cornea drying out  Prednisone and valtrex for 7 days  Recheck with your doctor in next several days  Lubricating eye drops during the day as needed

## 2024-01-05 ENCOUNTER — TRANSFERRED RECORDS (OUTPATIENT)
Dept: HEALTH INFORMATION MANAGEMENT | Facility: CLINIC | Age: 29
End: 2024-01-05
Payer: COMMERCIAL

## 2024-02-21 ENCOUNTER — TRANSFERRED RECORDS (OUTPATIENT)
Dept: HEALTH INFORMATION MANAGEMENT | Facility: CLINIC | Age: 29
End: 2024-02-21
Payer: COMMERCIAL

## 2024-06-23 ENCOUNTER — HEALTH MAINTENANCE LETTER (OUTPATIENT)
Age: 29
End: 2024-06-23

## 2025-07-12 ENCOUNTER — HEALTH MAINTENANCE LETTER (OUTPATIENT)
Age: 30
End: 2025-07-12

## (undated) DEVICE — LINEN TOWEL PACK X5 5464

## (undated) DEVICE — ESU GROUND PAD UNIVERSAL W/O CORD

## (undated) DEVICE — ENDO TROCAR 05MM VERSASTEP VS101005

## (undated) DEVICE — ESU HOLDER LAP INST DISP PURPLE LONG 330MM H-PRO-330

## (undated) DEVICE — CATH INTERMITTENT CLEAN-CATH FEMALE 14FR 6" VINYL LF 420614

## (undated) DEVICE — NDL INSUFFLATION 14GA STEP S100000

## (undated) DEVICE — GLOVE PROTEXIS BLUE W/NEU-THERA 6.5  2D73EB65

## (undated) DEVICE — SOL WATER IRRIG 1000ML BOTTLE 2F7114

## (undated) DEVICE — GLOVE PROTEXIS W/NEU-THERA 7.5  2D73TE75

## (undated) DEVICE — PREP DURAPREP 26ML APL 8630

## (undated) DEVICE — ESU CORD MONOPOLAR 10'  E0510

## (undated) DEVICE — Device

## (undated) DEVICE — SUCTION CANISTER MEDIVAC LINER 3000ML W/LID 65651-530

## (undated) DEVICE — SOL NACL 0.9% IRRIG 1000ML BOTTLE 07138-09

## (undated) RX ORDER — FENTANYL CITRATE 50 UG/ML
INJECTION, SOLUTION INTRAMUSCULAR; INTRAVENOUS
Status: DISPENSED
Start: 2018-07-05

## (undated) RX ORDER — NEOSTIGMINE METHYLSULFATE 1 MG/ML
VIAL (ML) INJECTION
Status: DISPENSED
Start: 2018-07-05

## (undated) RX ORDER — OXYCODONE HYDROCHLORIDE 5 MG/1
TABLET ORAL
Status: DISPENSED
Start: 2018-07-05

## (undated) RX ORDER — ONDANSETRON 2 MG/ML
INJECTION INTRAMUSCULAR; INTRAVENOUS
Status: DISPENSED
Start: 2018-07-05

## (undated) RX ORDER — LIDOCAINE HYDROCHLORIDE 20 MG/ML
INJECTION, SOLUTION EPIDURAL; INFILTRATION; INTRACAUDAL; PERINEURAL
Status: DISPENSED
Start: 2018-07-05

## (undated) RX ORDER — GLYCOPYRROLATE 0.2 MG/ML
INJECTION, SOLUTION INTRAMUSCULAR; INTRAVENOUS
Status: DISPENSED
Start: 2018-07-05

## (undated) RX ORDER — PROPOFOL 10 MG/ML
INJECTION, EMULSION INTRAVENOUS
Status: DISPENSED
Start: 2018-07-05